# Patient Record
Sex: MALE | Race: WHITE | NOT HISPANIC OR LATINO | Employment: OTHER | URBAN - NONMETROPOLITAN AREA
[De-identification: names, ages, dates, MRNs, and addresses within clinical notes are randomized per-mention and may not be internally consistent; named-entity substitution may affect disease eponyms.]

---

## 2023-02-27 PROBLEM — H35.30 MACULAR DEGENERATION: Status: ACTIVE | Noted: 2023-02-27

## 2023-02-27 PROBLEM — J32.9 CHRONIC SINUSITIS: Status: ACTIVE | Noted: 2023-02-27

## 2023-02-27 PROBLEM — E55.9 VITAMIN D DEFICIENCY: Status: ACTIVE | Noted: 2023-02-27

## 2023-02-27 PROBLEM — R91.8 MULTIPLE LUNG NODULES: Status: ACTIVE | Noted: 2023-02-27

## 2023-02-27 PROBLEM — M25.561 RIGHT KNEE PAIN: Status: ACTIVE | Noted: 2023-02-27

## 2023-02-27 PROBLEM — J30.9 ALLERGIC RHINITIS: Status: ACTIVE | Noted: 2023-02-27

## 2023-02-27 PROBLEM — I42.9 CARDIOMYOPATHY (MULTI): Status: ACTIVE | Noted: 2023-02-27

## 2023-02-27 PROBLEM — I48.0 PAROXYSMAL ATRIAL FIBRILLATION (MULTI): Status: ACTIVE | Noted: 2023-02-27

## 2023-02-27 PROBLEM — E78.5 HYPERLIPIDEMIA: Status: ACTIVE | Noted: 2023-02-27

## 2023-02-27 PROBLEM — E53.8 VITAMIN B12 DEFICIENCY: Status: ACTIVE | Noted: 2023-02-27

## 2023-02-27 PROBLEM — J45.909 REACTIVE AIRWAY DISEASE (HHS-HCC): Status: ACTIVE | Noted: 2023-02-27

## 2023-02-27 PROBLEM — K63.5 COLON POLYPS: Status: ACTIVE | Noted: 2023-02-27

## 2023-02-27 PROBLEM — N40.0 ENLARGED PROSTATE WITHOUT LOWER URINARY TRACT SYMPTOMS (LUTS): Status: ACTIVE | Noted: 2023-02-27

## 2023-02-27 PROBLEM — I87.2 EDEMA OF BOTH LOWER LEGS DUE TO PERIPHERAL VENOUS INSUFFICIENCY: Status: ACTIVE | Noted: 2023-02-27

## 2023-02-27 PROBLEM — I25.10 CAD (CORONARY ARTERY DISEASE): Status: ACTIVE | Noted: 2023-02-27

## 2023-02-27 PROBLEM — I49.3 FREQUENT PVCS: Status: ACTIVE | Noted: 2023-02-27

## 2023-02-27 PROBLEM — F41.9 ANXIETY AND DEPRESSION: Status: ACTIVE | Noted: 2023-02-27

## 2023-02-27 PROBLEM — N28.9 KIDNEY LESION, NATIVE, BILATERAL: Status: ACTIVE | Noted: 2023-02-27

## 2023-02-27 PROBLEM — E11.40 DIABETIC NEUROPATHY (MULTI): Status: ACTIVE | Noted: 2023-02-27

## 2023-02-27 PROBLEM — J31.0 CHRONIC RHINITIS: Status: ACTIVE | Noted: 2023-02-27

## 2023-02-27 PROBLEM — I10 BENIGN ESSENTIAL HYPERTENSION: Status: ACTIVE | Noted: 2023-02-27

## 2023-02-27 PROBLEM — R60.0 BILATERAL EDEMA OF LOWER EXTREMITY: Status: ACTIVE | Noted: 2023-02-27

## 2023-02-27 PROBLEM — R60.0 EDEMA OF BOTH LOWER LEGS DUE TO PERIPHERAL VENOUS INSUFFICIENCY: Status: ACTIVE | Noted: 2023-02-27

## 2023-02-27 PROBLEM — I34.0 MITRAL REGURGITATION: Status: ACTIVE | Noted: 2023-02-27

## 2023-02-27 PROBLEM — M54.30 SCIATICA: Status: ACTIVE | Noted: 2023-02-27

## 2023-02-27 PROBLEM — N40.1 BPH WITH OBSTRUCTION/LOWER URINARY TRACT SYMPTOMS: Status: ACTIVE | Noted: 2023-02-27

## 2023-02-27 PROBLEM — K21.9 GERD (GASTROESOPHAGEAL REFLUX DISEASE): Status: ACTIVE | Noted: 2023-02-27

## 2023-02-27 PROBLEM — R01.1 MURMUR: Status: ACTIVE | Noted: 2023-02-27

## 2023-02-27 PROBLEM — D48.5 BASAL CELL TUMOR: Status: ACTIVE | Noted: 2023-02-27

## 2023-02-27 PROBLEM — M17.11 LOCALIZED OSTEOARTHRITIS OF RIGHT KNEE: Status: ACTIVE | Noted: 2023-02-27

## 2023-02-27 PROBLEM — N13.8 BPH WITH OBSTRUCTION/LOWER URINARY TRACT SYMPTOMS: Status: ACTIVE | Noted: 2023-02-27

## 2023-02-27 PROBLEM — R06.09 DYSPNEA ON EXERTION: Status: ACTIVE | Noted: 2023-02-27

## 2023-02-27 PROBLEM — E11.9 DIABETES MELLITUS (MULTI): Status: ACTIVE | Noted: 2023-02-27

## 2023-02-27 PROBLEM — J01.90 ACUTE SINUSITIS: Status: ACTIVE | Noted: 2023-02-27

## 2023-02-27 PROBLEM — F32.A ANXIETY AND DEPRESSION: Status: ACTIVE | Noted: 2023-02-27

## 2023-02-27 PROBLEM — L71.9 ACNE ROSACEA: Status: ACTIVE | Noted: 2023-02-27

## 2023-02-27 RX ORDER — FINASTERIDE 5 MG/1
1 TABLET, FILM COATED ORAL
COMMUNITY
Start: 2014-03-12 | End: 2023-04-18 | Stop reason: SDUPTHER

## 2023-02-27 RX ORDER — LOSARTAN POTASSIUM AND HYDROCHLOROTHIAZIDE 12.5; 1 MG/1; MG/1
1 TABLET ORAL
COMMUNITY
Start: 2021-01-08 | End: 2023-04-18 | Stop reason: SDUPTHER

## 2023-02-27 RX ORDER — MONTELUKAST SODIUM 10 MG/1
1 TABLET ORAL EVERY EVENING
COMMUNITY
Start: 2018-10-03 | End: 2023-04-18 | Stop reason: SDUPTHER

## 2023-02-27 RX ORDER — METOPROLOL SUCCINATE 100 MG/1
0.5 TABLET, EXTENDED RELEASE ORAL DAILY
COMMUNITY
Start: 2021-11-09 | End: 2023-04-18 | Stop reason: SDUPTHER

## 2023-02-27 RX ORDER — ALBUTEROL SULFATE 90 UG/1
AEROSOL, METERED RESPIRATORY (INHALATION)
COMMUNITY
Start: 2020-05-15 | End: 2023-04-18 | Stop reason: SDUPTHER

## 2023-02-27 RX ORDER — LANCETS
EACH MISCELLANEOUS
COMMUNITY
End: 2024-04-15

## 2023-02-27 RX ORDER — ACETAMINOPHEN 500 MG
1 TABLET ORAL
COMMUNITY
End: 2023-04-18 | Stop reason: SDUPTHER

## 2023-02-27 RX ORDER — METFORMIN HYDROCHLORIDE 500 MG/1
1000 TABLET, EXTENDED RELEASE ORAL
COMMUNITY
Start: 2014-03-12 | End: 2023-04-18 | Stop reason: SDUPTHER

## 2023-02-27 RX ORDER — BLOOD SUGAR DIAGNOSTIC
STRIP MISCELLANEOUS
COMMUNITY
Start: 2017-05-05 | End: 2024-04-18 | Stop reason: SDUPTHER

## 2023-02-27 RX ORDER — TORSEMIDE 20 MG/1
20 TABLET ORAL
COMMUNITY
Start: 2022-08-26 | End: 2023-04-11 | Stop reason: SINTOL

## 2023-02-27 RX ORDER — ACETAMINOPHEN, DEXTROMETHORPHAN HBR, DOXYLAMINE SUCCINATE, PHENYLEPHRINE HCL 650; 20; 12.5; 1 MG/30ML; MG/30ML; MG/30ML; MG/30ML
1 SOLUTION ORAL
COMMUNITY

## 2023-02-27 RX ORDER — VENLAFAXINE 75 MG/1
1 TABLET ORAL
COMMUNITY
Start: 2019-06-13 | End: 2023-04-18 | Stop reason: SDUPTHER

## 2023-02-27 RX ORDER — LOVASTATIN 40 MG/1
40 TABLET ORAL
COMMUNITY
Start: 2014-03-12 | End: 2023-04-18 | Stop reason: SDUPTHER

## 2023-02-27 RX ORDER — OMEPRAZOLE 40 MG/1
40 CAPSULE, DELAYED RELEASE ORAL
COMMUNITY
Start: 2014-03-12 | End: 2023-04-18 | Stop reason: SDUPTHER

## 2023-02-27 RX ORDER — DOXYCYCLINE HYCLATE 50 MG/1
50 CAPSULE ORAL
COMMUNITY
Start: 2018-05-03 | End: 2023-03-13

## 2023-02-27 RX ORDER — TAMSULOSIN HYDROCHLORIDE 0.4 MG/1
2 CAPSULE ORAL NIGHTLY
COMMUNITY
Start: 2014-03-12 | End: 2023-04-18 | Stop reason: SDUPTHER

## 2023-02-27 RX ORDER — MELOXICAM 15 MG/1
15 TABLET ORAL DAILY PRN
COMMUNITY
Start: 2018-11-30 | End: 2023-04-18 | Stop reason: SDUPTHER

## 2023-03-11 DIAGNOSIS — L71.9 ROSACEA, UNSPECIFIED: ICD-10-CM

## 2023-03-13 RX ORDER — DOXYCYCLINE HYCLATE 50 MG/1
CAPSULE ORAL
Qty: 90 CAPSULE | Refills: 3 | Status: SHIPPED | OUTPATIENT
Start: 2023-03-13 | End: 2023-04-11

## 2023-04-11 ENCOUNTER — OFFICE VISIT (OUTPATIENT)
Dept: PRIMARY CARE | Facility: CLINIC | Age: 88
End: 2023-04-11
Payer: MEDICARE

## 2023-04-11 ENCOUNTER — LAB (OUTPATIENT)
Dept: LAB | Facility: LAB | Age: 88
End: 2023-04-11
Payer: MEDICARE

## 2023-04-11 VITALS
HEART RATE: 67 BPM | RESPIRATION RATE: 14 BRPM | WEIGHT: 179.6 LBS | TEMPERATURE: 97.7 F | BODY MASS INDEX: 29.92 KG/M2 | HEIGHT: 65 IN | SYSTOLIC BLOOD PRESSURE: 127 MMHG | OXYGEN SATURATION: 94 % | DIASTOLIC BLOOD PRESSURE: 66 MMHG

## 2023-04-11 DIAGNOSIS — E11.40 TYPE 2 DIABETES MELLITUS WITH DIABETIC NEUROPATHY, WITHOUT LONG-TERM CURRENT USE OF INSULIN (MULTI): ICD-10-CM

## 2023-04-11 DIAGNOSIS — E78.2 MIXED HYPERLIPIDEMIA: ICD-10-CM

## 2023-04-11 DIAGNOSIS — L71.9 ACNE ROSACEA: ICD-10-CM

## 2023-04-11 DIAGNOSIS — I10 BENIGN ESSENTIAL HYPERTENSION: ICD-10-CM

## 2023-04-11 DIAGNOSIS — Z12.5 SCREENING PSA (PROSTATE SPECIFIC ANTIGEN): ICD-10-CM

## 2023-04-11 DIAGNOSIS — H35.3132 INTERMEDIATE STAGE NONEXUDATIVE AGE-RELATED MACULAR DEGENERATION OF BOTH EYES: Primary | ICD-10-CM

## 2023-04-11 DIAGNOSIS — E11.49 OTHER DIABETIC NEUROLOGICAL COMPLICATION ASSOCIATED WITH TYPE 2 DIABETES MELLITUS (MULTI): ICD-10-CM

## 2023-04-11 DIAGNOSIS — Z00.00 MEDICARE ANNUAL WELLNESS VISIT, SUBSEQUENT: ICD-10-CM

## 2023-04-11 PROBLEM — H69.90 DYSFUNCTION OF EUSTACHIAN TUBE: Status: ACTIVE | Noted: 2019-07-11

## 2023-04-11 PROBLEM — K57.90 DIVERTICULOSIS: Status: ACTIVE | Noted: 2019-07-11

## 2023-04-11 PROBLEM — H91.90 HOH (HARD OF HEARING): Status: ACTIVE | Noted: 2023-04-11

## 2023-04-11 PROBLEM — J33.8 MULTIPLE POLYPS OF ETHMOID SINUS: Status: ACTIVE | Noted: 2023-04-11

## 2023-04-11 PROBLEM — Z86.010 HX OF ADENOMATOUS COLONIC POLYPS: Status: ACTIVE | Noted: 2023-04-11

## 2023-04-11 PROBLEM — K21.00 GASTRO-ESOPHAGEAL REFLUX DISEASE WITH ESOPHAGITIS: Status: ACTIVE | Noted: 2019-07-11

## 2023-04-11 PROBLEM — E66.9 OBESITY, CLASS I, BMI 30-34.9: Status: ACTIVE | Noted: 2018-06-28

## 2023-04-11 PROBLEM — E11.9 TYPE 2 DIABETES MELLITUS WITHOUT COMPLICATION (MULTI): Status: ACTIVE | Noted: 2019-07-11

## 2023-04-11 PROBLEM — E66.811 OBESITY, CLASS I, BMI 30-34.9: Status: ACTIVE | Noted: 2018-06-28

## 2023-04-11 PROBLEM — Z86.0101 HX OF ADENOMATOUS COLONIC POLYPS: Status: ACTIVE | Noted: 2023-04-11

## 2023-04-11 LAB
ALANINE AMINOTRANSFERASE (SGPT) (U/L) IN SER/PLAS: 21 U/L (ref 10–52)
ALBUMIN (G/DL) IN SER/PLAS: 4.4 G/DL (ref 3.4–5)
ALKALINE PHOSPHATASE (U/L) IN SER/PLAS: 55 U/L (ref 33–136)
ANION GAP IN SER/PLAS: 16 MMOL/L (ref 10–20)
ASPARTATE AMINOTRANSFERASE (SGOT) (U/L) IN SER/PLAS: 26 U/L (ref 9–39)
BASOPHILS (10*3/UL) IN BLOOD BY AUTOMATED COUNT: 0.04 X10E9/L (ref 0–0.1)
BASOPHILS/100 LEUKOCYTES IN BLOOD BY AUTOMATED COUNT: 0.6 % (ref 0–2)
BILIRUBIN TOTAL (MG/DL) IN SER/PLAS: 0.7 MG/DL (ref 0–1.2)
CALCIUM (MG/DL) IN SER/PLAS: 9.7 MG/DL (ref 8.6–10.6)
CARBON DIOXIDE, TOTAL (MMOL/L) IN SER/PLAS: 28 MMOL/L (ref 21–32)
CHLORIDE (MMOL/L) IN SER/PLAS: 99 MMOL/L (ref 98–107)
CHOLESTEROL (MG/DL) IN SER/PLAS: 138 MG/DL (ref 0–199)
CHOLESTEROL IN HDL (MG/DL) IN SER/PLAS: 47.3 MG/DL
CHOLESTEROL/HDL RATIO: 2.9
CREATININE (MG/DL) IN SER/PLAS: 1.68 MG/DL (ref 0.5–1.3)
EOSINOPHILS (10*3/UL) IN BLOOD BY AUTOMATED COUNT: 0.18 X10E9/L (ref 0–0.4)
EOSINOPHILS/100 LEUKOCYTES IN BLOOD BY AUTOMATED COUNT: 2.5 % (ref 0–6)
ERYTHROCYTE DISTRIBUTION WIDTH (RATIO) BY AUTOMATED COUNT: 14.3 % (ref 11.5–14.5)
ERYTHROCYTE MEAN CORPUSCULAR HEMOGLOBIN CONCENTRATION (G/DL) BY AUTOMATED: 32.5 G/DL (ref 32–36)
ERYTHROCYTE MEAN CORPUSCULAR VOLUME (FL) BY AUTOMATED COUNT: 93 FL (ref 80–100)
ERYTHROCYTES (10*6/UL) IN BLOOD BY AUTOMATED COUNT: 3.94 X10E12/L (ref 4.5–5.9)
ESTIMATED AVERAGE GLUCOSE FOR HBA1C: 134 MG/DL
GFR MALE: 39 ML/MIN/1.73M2
GLUCOSE (MG/DL) IN SER/PLAS: 108 MG/DL (ref 74–99)
HEMATOCRIT (%) IN BLOOD BY AUTOMATED COUNT: 36.6 % (ref 41–52)
HEMOGLOBIN (G/DL) IN BLOOD: 11.9 G/DL (ref 13.5–17.5)
HEMOGLOBIN A1C/HEMOGLOBIN TOTAL IN BLOOD: 6.3 %
IMMATURE GRANULOCYTES/100 LEUKOCYTES IN BLOOD BY AUTOMATED COUNT: 0.3 % (ref 0–0.9)
LDL: 58 MG/DL (ref 0–99)
LEUKOCYTES (10*3/UL) IN BLOOD BY AUTOMATED COUNT: 7.1 X10E9/L (ref 4.4–11.3)
LYMPHOCYTES (10*3/UL) IN BLOOD BY AUTOMATED COUNT: 1.37 X10E9/L (ref 0.8–3)
LYMPHOCYTES/100 LEUKOCYTES IN BLOOD BY AUTOMATED COUNT: 19.3 % (ref 13–44)
MONOCYTES (10*3/UL) IN BLOOD BY AUTOMATED COUNT: 0.93 X10E9/L (ref 0.05–0.8)
MONOCYTES/100 LEUKOCYTES IN BLOOD BY AUTOMATED COUNT: 13.1 % (ref 2–10)
NEUTROPHILS (10*3/UL) IN BLOOD BY AUTOMATED COUNT: 4.56 X10E9/L (ref 1.6–5.5)
NEUTROPHILS/100 LEUKOCYTES IN BLOOD BY AUTOMATED COUNT: 64.2 % (ref 40–80)
NRBC (PER 100 WBCS) BY AUTOMATED COUNT: 0 /100 WBC (ref 0–0)
PLATELETS (10*3/UL) IN BLOOD AUTOMATED COUNT: 207 X10E9/L (ref 150–450)
POTASSIUM (MMOL/L) IN SER/PLAS: 4.4 MMOL/L (ref 3.5–5.3)
PROSTATE SPECIFIC ANTIGEN,SCREEN: 2.33 NG/ML (ref 0–4)
PROTEIN TOTAL: 7.1 G/DL (ref 6.4–8.2)
SODIUM (MMOL/L) IN SER/PLAS: 139 MMOL/L (ref 136–145)
TRIGLYCERIDE (MG/DL) IN SER/PLAS: 162 MG/DL (ref 0–149)
UREA NITROGEN (MG/DL) IN SER/PLAS: 36 MG/DL (ref 6–23)
VLDL: 32 MG/DL (ref 0–40)

## 2023-04-11 PROCEDURE — G0439 PPPS, SUBSEQ VISIT: HCPCS | Performed by: FAMILY MEDICINE

## 2023-04-11 PROCEDURE — G0444 DEPRESSION SCREEN ANNUAL: HCPCS | Performed by: FAMILY MEDICINE

## 2023-04-11 PROCEDURE — 1036F TOBACCO NON-USER: CPT | Performed by: FAMILY MEDICINE

## 2023-04-11 PROCEDURE — 36415 COLL VENOUS BLD VENIPUNCTURE: CPT

## 2023-04-11 PROCEDURE — 80061 LIPID PANEL: CPT

## 2023-04-11 PROCEDURE — 1160F RVW MEDS BY RX/DR IN RCRD: CPT | Performed by: FAMILY MEDICINE

## 2023-04-11 PROCEDURE — 3074F SYST BP LT 130 MM HG: CPT | Performed by: FAMILY MEDICINE

## 2023-04-11 PROCEDURE — 99214 OFFICE O/P EST MOD 30 MIN: CPT | Performed by: FAMILY MEDICINE

## 2023-04-11 PROCEDURE — 85025 COMPLETE CBC W/AUTO DIFF WBC: CPT

## 2023-04-11 PROCEDURE — 1159F MED LIST DOCD IN RCRD: CPT | Performed by: FAMILY MEDICINE

## 2023-04-11 PROCEDURE — 84153 ASSAY OF PSA TOTAL: CPT

## 2023-04-11 PROCEDURE — 3078F DIAST BP <80 MM HG: CPT | Performed by: FAMILY MEDICINE

## 2023-04-11 PROCEDURE — 1170F FXNL STATUS ASSESSED: CPT | Performed by: FAMILY MEDICINE

## 2023-04-11 PROCEDURE — 83036 HEMOGLOBIN GLYCOSYLATED A1C: CPT

## 2023-04-11 PROCEDURE — 80053 COMPREHEN METABOLIC PANEL: CPT

## 2023-04-11 RX ORDER — FUROSEMIDE 20 MG/1
20 TABLET ORAL DAILY
COMMUNITY
Start: 2023-03-09 | End: 2023-04-18 | Stop reason: SDUPTHER

## 2023-04-11 RX ORDER — DOXYCYCLINE 50 MG/1
CAPSULE ORAL
Qty: 90 CAPSULE | Refills: 1 | Status: SHIPPED | OUTPATIENT
Start: 2023-04-11 | End: 2023-08-30 | Stop reason: ALTCHOICE

## 2023-04-11 ASSESSMENT — ACTIVITIES OF DAILY LIVING (ADL)
DRESSING: INDEPENDENT
GROCERY_SHOPPING: INDEPENDENT
TAKING_MEDICATION: INDEPENDENT
MANAGING_FINANCES: INDEPENDENT
BATHING: INDEPENDENT
DOING_HOUSEWORK: INDEPENDENT

## 2023-04-11 ASSESSMENT — PATIENT HEALTH QUESTIONNAIRE - PHQ9
2. FEELING DOWN, DEPRESSED OR HOPELESS: NOT AT ALL
1. LITTLE INTEREST OR PLEASURE IN DOING THINGS: NOT AT ALL
SUM OF ALL RESPONSES TO PHQ9 QUESTIONS 1 AND 2: 0

## 2023-04-11 ASSESSMENT — ENCOUNTER SYMPTOMS
DEPRESSION: 0
OCCASIONAL FEELINGS OF UNSTEADINESS: 1
LOSS OF SENSATION IN FEET: 0

## 2023-04-11 ASSESSMENT — PAIN SCALES - GENERAL: PAINLEVEL: 6

## 2023-04-11 NOTE — PROGRESS NOTES
"Subjective   Reason for Visit: Agustin Panda is an 88 y.o. male here for a Medicare Wellness visit.          Reviewed all medications by prescribing practitioner or clinical pharmacist (such as prescriptions, OTCs, herbal therapies and supplements) and documented in the medical record.    HPI    Patient Care Team:  Martin Sanchez MD as PCP - General  Martin Sanchez MD as PCP - [employer] PCP     Review of Systems    Objective   Vitals:  /66 (BP Location: Right arm, Patient Position: Sitting, BP Cuff Size: Adult)   Pulse 67   Temp 36.5 °C (97.7 °F) (Temporal)   Resp 14   Ht 1.651 m (5' 5\")   Wt 81.5 kg (179 lb 9.6 oz)   SpO2 94%   BMI 29.89 kg/m²       Physical Exam    Assessment/Plan   Problem List Items Addressed This Visit    None         "

## 2023-04-11 NOTE — PROGRESS NOTES
"Subjective   Patient ID: Agustin Panda is a 88 y.o. male who presents for Medicare Annual Wellness Visit Subsequent, Edema (Left foot still swollen since last time he here), and Diabetes (Wants a new meter that he does not need to prick his finger anymore, Dexcom sensor ).    HPI   Farhad was seen today for a routine follow-up of his hyperlipidemia, diabetes, rosacea, left lower extremity edema, other medications.  He is now back on Lasix, off of torsemide due to muscle cramps, which have improved.  Labs from August reviewed, he is fasting today.  Urine for albumin is up-to-date.  We discussed urology guidelines, lack of need for PSA, he would still like this checked.    He is still frustrated with his left lower extremity edema, states that it is 80% better in the morning, worsens by evening.  He has tried a couple of compression stockings, has difficulty getting them on, taking them off.  Medication(s) are being taken and tolerated as prescribed, without concerns, list reconciled today.  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  He has seen optometry, but not ophthalmology recently.  He is to see Dr. Vernon but has not seen a retinal specialist.    Review of Systems  The full, 10+ multi-organ review of systems, is within normal limits with the exception of what is noted above in HPI.  Objective   /66 (BP Location: Right arm, Patient Position: Sitting, BP Cuff Size: Adult)   Pulse 67   Temp 36.5 °C (97.7 °F) (Temporal)   Resp 14   Ht 1.651 m (5' 5\")   Wt 81.5 kg (179 lb 9.6 oz)   SpO2 94%   BMI 29.89 kg/m²     Physical Exam  Constitutional/General appearance: alert, oriented, well-appearing, in no distress  Head and face exam is normal  No scleral icterus or conjunctival erythema present  Hearing is grossly normal  Respiratory effort is normal, no " dyspnea noted  Cortical function is normal  Mood, affect, are pleasant, appropriate, and interactive.  Insight is normal  Cardiac exam reveals a regular rate and rhythm,  murmur present.   Lungs are clear bilaterally.    Trace right lower extremity edema present, 1+ left lower extremity edema, ends just above the ankle.  Foot exam otherwise normal with exception of onychomycosis, multiple toes.    Assessment/Plan     Type II diabetes mellitus--- since the HBA1C's are/have remained stable, the current plan will be continued, including prescription medication (refilled as noted), a low carbohydrate diet, as well as regular exercise as tolerated.  I have recommended the next followup be in 6 months.  The importance of an annual dilated diabetic eye exam, annual urine for microalbumin, an annual foot exam, as well as signs and symptoms of hypoglycemia and peripheral neuropathy were stressed.  Routine self examination of the feet will be continued.  Labs will be assessed on at least a twice yearly basis.    Cardiomyopathy, continue current medications, follow-up with cardiology    Hyperlipidemia--- since lipid panels are/have been stable, I have recommended continuing the current regimen.  This includes a low fat/high-fiber diet, to include foods rich in natural Omega-3's, such as seafood, nuts, and olives, so long as allergies do not prohibit.  Exercise should be continued as able.  Refills were sent as needed.  We will continue followup on an every six-month basis, and will address further needs/issues should they arise.    Depression-----currently, symptoms are stable, both physical and emotional.  We will continue the current regimen of medication, as noted above.  Refills were provided, as needed.  Behavioral measures are to be continued, including a healthy diet, regular exercise, good sleep hygiene, minimal caffeine, and avoidance of alcohol.  Follow up should be in 6 months, or sooner if needed    Rosacea, change  doxycycline to every Monday Wednesday Friday    Hypertension--- since today's blood pressures are at goal, I have recommended continuing the current treatment regimen, including medication as noted above, as well as a low salt, low-fat, high-fiber diet.  Exercise is to be continued as able and tolerated.  We will continue to follow the high blood pressure on an every six-month basis, and address additional needs should they arise.    All Medicare gaps closed.

## 2023-04-14 DIAGNOSIS — I10 BENIGN ESSENTIAL HYPERTENSION: Primary | ICD-10-CM

## 2023-04-14 NOTE — RESULT ENCOUNTER NOTE
All labs are stable and reassuring, with the exception of his kidney function, which is decreased, perhaps due to his diuretic, Lasix.  Recommend stopping it for 5 to 7 days, coming back for a lab recheck, order placed

## 2023-04-18 DIAGNOSIS — F41.9 ANXIETY AND DEPRESSION: ICD-10-CM

## 2023-04-18 DIAGNOSIS — E08.00 DIABETES MELLITUS DUE TO UNDERLYING CONDITION WITH HYPEROSMOLARITY WITHOUT COMA, WITHOUT LONG-TERM CURRENT USE OF INSULIN (MULTI): ICD-10-CM

## 2023-04-18 DIAGNOSIS — F32.A ANXIETY AND DEPRESSION: ICD-10-CM

## 2023-04-18 DIAGNOSIS — E55.9 VITAMIN D DEFICIENCY: ICD-10-CM

## 2023-04-18 DIAGNOSIS — M25.561 RIGHT KNEE PAIN, UNSPECIFIED CHRONICITY: ICD-10-CM

## 2023-04-18 DIAGNOSIS — K21.00 GASTROESOPHAGEAL REFLUX DISEASE WITH ESOPHAGITIS WITHOUT HEMORRHAGE: ICD-10-CM

## 2023-04-18 DIAGNOSIS — I42.8 OTHER CARDIOMYOPATHY (MULTI): ICD-10-CM

## 2023-04-18 DIAGNOSIS — N40.1 BPH WITH OBSTRUCTION/LOWER URINARY TRACT SYMPTOMS: ICD-10-CM

## 2023-04-18 DIAGNOSIS — N13.8 BPH WITH OBSTRUCTION/LOWER URINARY TRACT SYMPTOMS: ICD-10-CM

## 2023-04-18 DIAGNOSIS — J45.30 MILD PERSISTENT REACTIVE AIRWAY DISEASE WITHOUT COMPLICATION (HHS-HCC): ICD-10-CM

## 2023-04-18 DIAGNOSIS — E78.49 OTHER HYPERLIPIDEMIA: ICD-10-CM

## 2023-04-18 DIAGNOSIS — I10 BENIGN ESSENTIAL HYPERTENSION: ICD-10-CM

## 2023-04-18 RX ORDER — LOVASTATIN 40 MG/1
40 TABLET ORAL
Qty: 90 TABLET | Refills: 1 | Status: SHIPPED | OUTPATIENT
Start: 2023-04-18 | End: 2023-11-22 | Stop reason: SDUPTHER

## 2023-04-18 RX ORDER — MELOXICAM 15 MG/1
15 TABLET ORAL DAILY PRN
Qty: 90 TABLET | Refills: 2 | Status: SHIPPED | OUTPATIENT
Start: 2023-04-18 | End: 2023-04-24 | Stop reason: SINTOL

## 2023-04-18 RX ORDER — FINASTERIDE 5 MG/1
5 TABLET, FILM COATED ORAL
Qty: 90 TABLET | Refills: 1 | Status: SHIPPED | OUTPATIENT
Start: 2023-04-18 | End: 2023-11-15 | Stop reason: SDUPTHER

## 2023-04-18 RX ORDER — ACETAMINOPHEN 500 MG
5000 TABLET ORAL
Qty: 90 TABLET | Refills: 1 | Status: SHIPPED | OUTPATIENT
Start: 2023-04-18 | End: 2023-10-13

## 2023-04-18 RX ORDER — TAMSULOSIN HYDROCHLORIDE 0.4 MG/1
0.8 CAPSULE ORAL NIGHTLY
Qty: 90 CAPSULE | Refills: 1 | Status: SHIPPED | OUTPATIENT
Start: 2023-04-18 | End: 2023-07-16

## 2023-04-18 RX ORDER — LOSARTAN POTASSIUM AND HYDROCHLOROTHIAZIDE 12.5; 1 MG/1; MG/1
1 TABLET ORAL
Qty: 90 TABLET | Refills: 1 | Status: SHIPPED | OUTPATIENT
Start: 2023-04-18 | End: 2023-04-24 | Stop reason: SINTOL

## 2023-04-18 RX ORDER — OMEPRAZOLE 40 MG/1
40 CAPSULE, DELAYED RELEASE ORAL
Qty: 90 CAPSULE | Refills: 1 | Status: SHIPPED | OUTPATIENT
Start: 2023-04-18 | End: 2023-05-16 | Stop reason: ALTCHOICE

## 2023-04-18 RX ORDER — METFORMIN HYDROCHLORIDE 500 MG/1
1000 TABLET, EXTENDED RELEASE ORAL
Qty: 180 TABLET | Refills: 1 | Status: SHIPPED | OUTPATIENT
Start: 2023-04-18 | End: 2023-05-12 | Stop reason: ALTCHOICE

## 2023-04-18 RX ORDER — ALBUTEROL SULFATE 90 UG/1
2 AEROSOL, METERED RESPIRATORY (INHALATION) EVERY 6 HOURS PRN
Qty: 18 G | Refills: 2 | Status: SHIPPED | OUTPATIENT
Start: 2023-04-18 | End: 2024-01-24 | Stop reason: ALTCHOICE

## 2023-04-18 RX ORDER — VENLAFAXINE 75 MG/1
75 TABLET ORAL
Qty: 180 TABLET | Refills: 1 | Status: SHIPPED | OUTPATIENT
Start: 2023-04-18 | End: 2023-05-12 | Stop reason: ALTCHOICE

## 2023-04-18 RX ORDER — MONTELUKAST SODIUM 10 MG/1
10 TABLET ORAL EVERY EVENING
Qty: 90 TABLET | Refills: 1 | Status: SHIPPED | OUTPATIENT
Start: 2023-04-18 | End: 2023-10-18

## 2023-04-18 RX ORDER — FUROSEMIDE 20 MG/1
20 TABLET ORAL DAILY
Qty: 90 TABLET | Refills: 1 | Status: SHIPPED | OUTPATIENT
Start: 2023-04-18 | End: 2023-05-12 | Stop reason: SINTOL

## 2023-04-18 RX ORDER — METOPROLOL SUCCINATE 100 MG/1
50 TABLET, EXTENDED RELEASE ORAL DAILY
Qty: 90 TABLET | Refills: 1 | Status: SHIPPED | OUTPATIENT
Start: 2023-04-18 | End: 2024-04-04 | Stop reason: ALTCHOICE

## 2023-04-21 ENCOUNTER — LAB (OUTPATIENT)
Dept: LAB | Facility: LAB | Age: 88
End: 2023-04-21
Payer: MEDICARE

## 2023-04-21 DIAGNOSIS — I10 BENIGN ESSENTIAL HYPERTENSION: ICD-10-CM

## 2023-04-21 LAB
ANION GAP IN SER/PLAS: 15 MMOL/L (ref 10–20)
CALCIUM (MG/DL) IN SER/PLAS: 9.2 MG/DL (ref 8.6–10.6)
CARBON DIOXIDE, TOTAL (MMOL/L) IN SER/PLAS: 26 MMOL/L (ref 21–32)
CHLORIDE (MMOL/L) IN SER/PLAS: 103 MMOL/L (ref 98–107)
CREATININE (MG/DL) IN SER/PLAS: 1.49 MG/DL (ref 0.5–1.3)
GFR MALE: 45 ML/MIN/1.73M2
GLUCOSE (MG/DL) IN SER/PLAS: 102 MG/DL (ref 74–99)
POTASSIUM (MMOL/L) IN SER/PLAS: 4.2 MMOL/L (ref 3.5–5.3)
SODIUM (MMOL/L) IN SER/PLAS: 140 MMOL/L (ref 136–145)
UREA NITROGEN (MG/DL) IN SER/PLAS: 27 MG/DL (ref 6–23)

## 2023-04-21 PROCEDURE — 80048 BASIC METABOLIC PNL TOTAL CA: CPT

## 2023-04-21 PROCEDURE — 36415 COLL VENOUS BLD VENIPUNCTURE: CPT

## 2023-04-24 DIAGNOSIS — I10 BENIGN ESSENTIAL HYPERTENSION: Primary | ICD-10-CM

## 2023-04-24 RX ORDER — LOSARTAN POTASSIUM 100 MG/1
100 TABLET ORAL DAILY
Qty: 90 TABLET | Refills: 3 | Status: SHIPPED | OUTPATIENT
Start: 2023-04-24 | End: 2023-11-15 | Stop reason: SDUPTHER

## 2023-04-24 NOTE — RESULT ENCOUNTER NOTE
Kidney function is still mildly reduced from his baseline.  Need to do 2 things.  Stop meloxicam (is his anti-inflammatory pain med), as it can irritate the kidneys.  Stop his current losartan/hydrochlorothiazide, need to get rid of the diuretic part, and replace with plain losartan 100mg daily, Rx sent to [facility].  Recheck kidney function in 3 weeks or so

## 2023-05-08 ENCOUNTER — TELEPHONE (OUTPATIENT)
Dept: PRIMARY CARE | Facility: CLINIC | Age: 88
End: 2023-05-08
Payer: MEDICARE

## 2023-05-08 NOTE — TELEPHONE ENCOUNTER
Since his kidney function was a little off in April, I wouldn't make any changes until it is rechecked.  Since he is seeing me Friday, can he get the blood work checked Thursday?

## 2023-05-08 NOTE — TELEPHONE ENCOUNTER
Pt calling said he is going in for his blood work on Friday to have his kidney levels checked like you wanted.     Pt said that both of his feet are now swollen and that he mentions at last visit. Wanted to know what you advise? Aware you are out till tomorrow.    Looks like you had an opening on Friday Morning so I scheduled pt.

## 2023-05-08 NOTE — TELEPHONE ENCOUNTER
Patient is concerned that he will not have a ride to the lab/our office two days in a row.  I advised patient anytime he can get a ride to the lab prior to Friday would be fine and he does not have to fast.  He will call us if he has any issues.  He is aware this needs rechecked before swollen legs can be assessed.

## 2023-05-11 ENCOUNTER — LAB (OUTPATIENT)
Dept: LAB | Facility: LAB | Age: 88
End: 2023-05-11
Payer: MEDICARE

## 2023-05-11 DIAGNOSIS — I10 BENIGN ESSENTIAL HYPERTENSION: ICD-10-CM

## 2023-05-11 PROCEDURE — 36415 COLL VENOUS BLD VENIPUNCTURE: CPT

## 2023-05-11 PROCEDURE — 80048 BASIC METABOLIC PNL TOTAL CA: CPT

## 2023-05-12 ENCOUNTER — OFFICE VISIT (OUTPATIENT)
Dept: PRIMARY CARE | Facility: CLINIC | Age: 88
End: 2023-05-12
Payer: MEDICARE

## 2023-05-12 VITALS
WEIGHT: 187.5 LBS | DIASTOLIC BLOOD PRESSURE: 70 MMHG | SYSTOLIC BLOOD PRESSURE: 166 MMHG | HEART RATE: 61 BPM | RESPIRATION RATE: 14 BRPM | TEMPERATURE: 97.8 F | OXYGEN SATURATION: 94 % | BODY MASS INDEX: 31.2 KG/M2

## 2023-05-12 DIAGNOSIS — R60.0 BILATERAL EDEMA OF LOWER EXTREMITY: ICD-10-CM

## 2023-05-12 DIAGNOSIS — I42.9 CARDIOMYOPATHY, UNSPECIFIED TYPE (MULTI): ICD-10-CM

## 2023-05-12 DIAGNOSIS — E11.49 OTHER DIABETIC NEUROLOGICAL COMPLICATION ASSOCIATED WITH TYPE 2 DIABETES MELLITUS (MULTI): ICD-10-CM

## 2023-05-12 DIAGNOSIS — I10 BENIGN ESSENTIAL HYPERTENSION: ICD-10-CM

## 2023-05-12 DIAGNOSIS — N28.9 DECREASED RENAL FUNCTION: ICD-10-CM

## 2023-05-12 DIAGNOSIS — J30.9 ALLERGIC RHINITIS, UNSPECIFIED SEASONALITY, UNSPECIFIED TRIGGER: Primary | ICD-10-CM

## 2023-05-12 LAB
ANION GAP IN SER/PLAS: 13 MMOL/L (ref 10–20)
CALCIUM (MG/DL) IN SER/PLAS: 9 MG/DL (ref 8.6–10.6)
CARBON DIOXIDE, TOTAL (MMOL/L) IN SER/PLAS: 26 MMOL/L (ref 21–32)
CHLORIDE (MMOL/L) IN SER/PLAS: 104 MMOL/L (ref 98–107)
CREATININE (MG/DL) IN SER/PLAS: 1.25 MG/DL (ref 0.5–1.3)
GFR MALE: 56 ML/MIN/1.73M2
GLUCOSE (MG/DL) IN SER/PLAS: 94 MG/DL (ref 74–99)
POTASSIUM (MMOL/L) IN SER/PLAS: 4.5 MMOL/L (ref 3.5–5.3)
SODIUM (MMOL/L) IN SER/PLAS: 138 MMOL/L (ref 136–145)
UREA NITROGEN (MG/DL) IN SER/PLAS: 18 MG/DL (ref 6–23)

## 2023-05-12 PROCEDURE — 3077F SYST BP >= 140 MM HG: CPT | Performed by: FAMILY MEDICINE

## 2023-05-12 PROCEDURE — 1159F MED LIST DOCD IN RCRD: CPT | Performed by: FAMILY MEDICINE

## 2023-05-12 PROCEDURE — 99215 OFFICE O/P EST HI 40 MIN: CPT | Performed by: FAMILY MEDICINE

## 2023-05-12 PROCEDURE — 96372 THER/PROPH/DIAG INJ SC/IM: CPT | Performed by: FAMILY MEDICINE

## 2023-05-12 PROCEDURE — 1160F RVW MEDS BY RX/DR IN RCRD: CPT | Performed by: FAMILY MEDICINE

## 2023-05-12 PROCEDURE — 3078F DIAST BP <80 MM HG: CPT | Performed by: FAMILY MEDICINE

## 2023-05-12 PROCEDURE — 1036F TOBACCO NON-USER: CPT | Performed by: FAMILY MEDICINE

## 2023-05-12 RX ORDER — HYDROCHLOROTHIAZIDE 12.5 MG/1
12.5 TABLET ORAL DAILY
Qty: 30 TABLET | Refills: 0 | Status: SHIPPED | OUTPATIENT
Start: 2023-05-12 | End: 2023-06-09

## 2023-05-12 RX ORDER — TRIAMCINOLONE ACETONIDE 40 MG/ML
40 INJECTION, SUSPENSION INTRA-ARTICULAR; INTRAMUSCULAR ONCE
Status: COMPLETED | OUTPATIENT
Start: 2023-05-12 | End: 2023-05-12

## 2023-05-12 RX ADMIN — TRIAMCINOLONE ACETONIDE 40 MG: 40 INJECTION, SUSPENSION INTRA-ARTICULAR; INTRAMUSCULAR at 10:23

## 2023-05-12 ASSESSMENT — PAIN SCALES - GENERAL: PAINLEVEL: 0-NO PAIN

## 2023-05-12 NOTE — PROGRESS NOTES
Subjective   Patient ID: Agustin Panda is a 88 y.o. male who presents for Results (Here to discuss blood work results regarding his kidneys), Allergic Rhinitis, and Edema.    HPI   Farhad was seen today for a follow-up and review of his recent labs, decreased kidney function, chronic lower extremity edema.  It has been a juggling act, using medications to help edema, but not decreasing kidney function.  His cardiomyopathy, ejection fraction certainly benefits from a diuretic.  He has been on furosemide and torsemide, which he believes helped his edema only a little.  He has been unable to comfortably wear compression stockings, has much difficulty getting them on and off.  GFR levels have been around 40 the last couple of checks.  He notes chronic dyspnea on exertion, no different off of his Lasix.  He has no orthopnea, paroxysmal nocturnal dyspnea.  Labs were reviewed, A1c 6.3.  Allergies have gotten severe, requests an injection.  Review of Systems  The full, 10+ multi-organ review of systems, is within normal limits with the exception of what is noted above in HPI.  Objective   /70 (BP Location: Right arm, Patient Position: Sitting, BP Cuff Size: Adult)   Pulse 61   Temp 36.6 °C (97.8 °F) (Temporal)   Resp 14   Wt 85 kg (187 lb 8 oz)   SpO2 94%   BMI 31.20 kg/m²     Physical Exam  Cardiac exam reveals a regular rate and rhythm,  murmur present.   Lungs are clear bilaterally.    No lower extremity edema present.  2+ left lower extremity edema to just below the knee noted.  1+ right lower extremity edema extends about two thirds up the tibia.  Both sides are pitting.  Assessment/Plan        We have had a difficult time balancing effective enough diuretic effect that his edema improves, and does not worsen his GFR.  He ideally needs some form of a diuretic given his cardiomyopathy, which has improved.  His readmit improved minimally off of furosemide.  Blood pressure however is much higher than normal.   I have recommended he continue losartan, try adding hydrochlorothiazide, to see if it gives him some edema benefit, without affecting GFR.    Because of his kidney function, I recommend discontinuing omeprazole, avoiding NSAIDs.  His A1c is sufficiently good that I would like him to stop his metformin for now as well.  We will recheck kidney function in about 3 weeks.    Continue all other medications.      Total time spent with patient, reviewing records, and completing charting was 40 minutes, over half of it spent counseling and/or coordinating care  **Portions of this medical record have been created using voice recognition software and may have minor errors which are inherent in voice recognition systems. It has not been fully edited for typographical or grammatical errors**

## 2023-05-12 NOTE — PATIENT INSTRUCTIONS
Since kidney function remains decreased, please stop omeprazole, stop metformin.    You still need better blood pressure control, start hydrochlorothiazide 12.5 mg every morning.  It is a mild diuretic.  Continue losartan    Recheck kidney function blood work in about 3 weeks, order is in the system.    Allergy shot given today  Call with new problems or concerns.

## 2023-05-15 NOTE — RESULT ENCOUNTER NOTE
Kidney function is now essentially normal.  Take the new diuretic prescribed at [facility] (hydrochlorothiazide), recheck kidney function in 3 weeks

## 2023-05-16 ENCOUNTER — TELEPHONE (OUTPATIENT)
Dept: PRIMARY CARE | Facility: CLINIC | Age: 88
End: 2023-05-16

## 2023-05-16 RX ORDER — OMEPRAZOLE 40 MG/1
40 CAPSULE, DELAYED RELEASE ORAL DAILY
COMMUNITY
End: 2024-01-17 | Stop reason: SDUPTHER

## 2023-05-16 NOTE — TELEPHONE ENCOUNTER
Pt called because he would like to restart a medication.  He would like to be put back on Omeprazole. Since stopping he has has issues with heartburn and acid reflex. If ok to restart he still has pills at home.

## 2023-06-02 ENCOUNTER — LAB (OUTPATIENT)
Dept: LAB | Facility: LAB | Age: 88
End: 2023-06-02
Payer: MEDICARE

## 2023-06-02 DIAGNOSIS — N28.9 DECREASED RENAL FUNCTION: ICD-10-CM

## 2023-06-02 PROCEDURE — 36415 COLL VENOUS BLD VENIPUNCTURE: CPT

## 2023-06-02 PROCEDURE — 80048 BASIC METABOLIC PNL TOTAL CA: CPT

## 2023-06-03 LAB
ANION GAP IN SER/PLAS: 16 MMOL/L (ref 10–20)
CALCIUM (MG/DL) IN SER/PLAS: 9.2 MG/DL (ref 8.6–10.6)
CARBON DIOXIDE, TOTAL (MMOL/L) IN SER/PLAS: 21 MMOL/L (ref 21–32)
CHLORIDE (MMOL/L) IN SER/PLAS: 105 MMOL/L (ref 98–107)
CREATININE (MG/DL) IN SER/PLAS: 1.3 MG/DL (ref 0.5–1.3)
GFR MALE: 53 ML/MIN/1.73M2
GLUCOSE (MG/DL) IN SER/PLAS: 109 MG/DL (ref 74–99)
POTASSIUM (MMOL/L) IN SER/PLAS: 4.8 MMOL/L (ref 3.5–5.3)
SODIUM (MMOL/L) IN SER/PLAS: 137 MMOL/L (ref 136–145)
UREA NITROGEN (MG/DL) IN SER/PLAS: 24 MG/DL (ref 6–23)

## 2023-06-09 DIAGNOSIS — I10 BENIGN ESSENTIAL HYPERTENSION: ICD-10-CM

## 2023-06-09 DIAGNOSIS — I42.9 CARDIOMYOPATHY, UNSPECIFIED TYPE (MULTI): ICD-10-CM

## 2023-06-09 RX ORDER — HYDROCHLOROTHIAZIDE 12.5 MG/1
TABLET ORAL
Qty: 3090 TABLET | Refills: 3 | Status: SHIPPED | OUTPATIENT
Start: 2023-06-09 | End: 2023-07-03

## 2023-07-03 ENCOUNTER — TELEPHONE (OUTPATIENT)
Dept: PRIMARY CARE | Facility: CLINIC | Age: 88
End: 2023-07-03
Payer: MEDICARE

## 2023-07-03 NOTE — TELEPHONE ENCOUNTER
Reconciled/removed hydrochlorothiazide from chart.  Metformin was not in Epic.  Patient is aware he will need to contact his pharmacy and ask for his auto-fill to be removed from his account and to stop mailing to him.

## 2023-07-03 NOTE — TELEPHONE ENCOUNTER
Pt called because the pharmacy keeps filling medications he is no longer taking. Pt would like Hydrochlorothiazide and Metformin removed from his chart. He is getting charged. He states he doesn't drive so it just shows up in his mailbox. KODY pt will forward to dianne.

## 2023-07-15 DIAGNOSIS — N13.8 BPH WITH OBSTRUCTION/LOWER URINARY TRACT SYMPTOMS: ICD-10-CM

## 2023-07-15 DIAGNOSIS — N40.1 BPH WITH OBSTRUCTION/LOWER URINARY TRACT SYMPTOMS: ICD-10-CM

## 2023-07-16 RX ORDER — TAMSULOSIN HYDROCHLORIDE 0.4 MG/1
0.8 CAPSULE ORAL NIGHTLY
Qty: 180 CAPSULE | Refills: 3 | Status: SHIPPED | OUTPATIENT
Start: 2023-07-16 | End: 2023-11-10 | Stop reason: SDUPTHER

## 2023-07-28 ENCOUNTER — TELEPHONE (OUTPATIENT)
Dept: PRIMARY CARE | Facility: CLINIC | Age: 88
End: 2023-07-28
Payer: MEDICARE

## 2023-07-28 NOTE — TELEPHONE ENCOUNTER
Patient called for a refill on his potassium I do not need it on his med list, if he is supposed to be taking it please send it to his local pharmacy.

## 2023-07-31 NOTE — TELEPHONE ENCOUNTER
Patient aware he should not be taking any longer.  He also asked about other refills and I let him know they were sent in for a year in April.

## 2023-08-30 ENCOUNTER — TELEPHONE (OUTPATIENT)
Dept: PRIMARY CARE | Facility: CLINIC | Age: 88
End: 2023-08-30

## 2023-08-30 ENCOUNTER — OFFICE VISIT (OUTPATIENT)
Dept: PRIMARY CARE | Facility: CLINIC | Age: 88
End: 2023-08-30
Payer: MEDICARE

## 2023-08-30 VITALS
BODY MASS INDEX: 31.15 KG/M2 | TEMPERATURE: 98.1 F | SYSTOLIC BLOOD PRESSURE: 161 MMHG | DIASTOLIC BLOOD PRESSURE: 81 MMHG | HEART RATE: 65 BPM | OXYGEN SATURATION: 97 % | RESPIRATION RATE: 16 BRPM | WEIGHT: 187.2 LBS

## 2023-08-30 DIAGNOSIS — E11.49 OTHER DIABETIC NEUROLOGICAL COMPLICATION ASSOCIATED WITH TYPE 2 DIABETES MELLITUS (MULTI): ICD-10-CM

## 2023-08-30 DIAGNOSIS — E11.40 TYPE 2 DIABETES MELLITUS WITH DIABETIC NEUROPATHY, WITHOUT LONG-TERM CURRENT USE OF INSULIN (MULTI): ICD-10-CM

## 2023-08-30 DIAGNOSIS — H35.3132 INTERMEDIATE STAGE NONEXUDATIVE AGE-RELATED MACULAR DEGENERATION OF BOTH EYES: ICD-10-CM

## 2023-08-30 DIAGNOSIS — E78.2 MIXED HYPERLIPIDEMIA: ICD-10-CM

## 2023-08-30 DIAGNOSIS — J01.90 ACUTE NON-RECURRENT SINUSITIS, UNSPECIFIED LOCATION: Primary | ICD-10-CM

## 2023-08-30 DIAGNOSIS — I10 BENIGN ESSENTIAL HYPERTENSION: ICD-10-CM

## 2023-08-30 PROCEDURE — 3077F SYST BP >= 140 MM HG: CPT | Performed by: FAMILY MEDICINE

## 2023-08-30 PROCEDURE — 1036F TOBACCO NON-USER: CPT | Performed by: FAMILY MEDICINE

## 2023-08-30 PROCEDURE — 99214 OFFICE O/P EST MOD 30 MIN: CPT | Performed by: FAMILY MEDICINE

## 2023-08-30 PROCEDURE — 1126F AMNT PAIN NOTED NONE PRSNT: CPT | Performed by: FAMILY MEDICINE

## 2023-08-30 PROCEDURE — 1159F MED LIST DOCD IN RCRD: CPT | Performed by: FAMILY MEDICINE

## 2023-08-30 PROCEDURE — 1160F RVW MEDS BY RX/DR IN RCRD: CPT | Performed by: FAMILY MEDICINE

## 2023-08-30 PROCEDURE — 3079F DIAST BP 80-89 MM HG: CPT | Performed by: FAMILY MEDICINE

## 2023-08-30 RX ORDER — AZELASTINE 1 MG/ML
1-2 SPRAY, METERED NASAL 2 TIMES DAILY
COMMUNITY

## 2023-08-30 RX ORDER — AMOXICILLIN AND CLAVULANATE POTASSIUM 875; 125 MG/1; MG/1
TABLET, FILM COATED ORAL
Qty: 14 TABLET | Refills: 0 | Status: SHIPPED | OUTPATIENT
Start: 2023-08-30 | End: 2023-09-12 | Stop reason: ALTCHOICE

## 2023-08-30 ASSESSMENT — PATIENT HEALTH QUESTIONNAIRE - PHQ9
9. THOUGHTS THAT YOU WOULD BE BETTER OFF DEAD, OR OF HURTING YOURSELF: NEARLY EVERY DAY
SUM OF ALL RESPONSES TO PHQ QUESTIONS 1-9: 12
5. POOR APPETITE OR OVEREATING: NOT AT ALL
6. FEELING BAD ABOUT YOURSELF - OR THAT YOU ARE A FAILURE OR HAVE LET YOURSELF OR YOUR FAMILY DOWN: NOT AT ALL
3. TROUBLE FALLING OR STAYING ASLEEP OR SLEEPING TOO MUCH: NEARLY EVERY DAY
4. FEELING TIRED OR HAVING LITTLE ENERGY: NEARLY EVERY DAY
1. LITTLE INTEREST OR PLEASURE IN DOING THINGS: NOT AT ALL
SUM OF ALL RESPONSES TO PHQ9 QUESTIONS 1 AND 2: 3
2. FEELING DOWN, DEPRESSED OR HOPELESS: NEARLY EVERY DAY
8. MOVING OR SPEAKING SO SLOWLY THAT OTHER PEOPLE COULD HAVE NOTICED. OR THE OPPOSITE, BEING SO FIGETY OR RESTLESS THAT YOU HAVE BEEN MOVING AROUND A LOT MORE THAN USUAL: NOT AT ALL
10. IF YOU CHECKED OFF ANY PROBLEMS, HOW DIFFICULT HAVE THESE PROBLEMS MADE IT FOR YOU TO DO YOUR WORK, TAKE CARE OF THINGS AT HOME, OR GET ALONG WITH OTHER PEOPLE: NOT DIFFICULT AT ALL
7. TROUBLE CONCENTRATING ON THINGS, SUCH AS READING THE NEWSPAPER OR WATCHING TELEVISION: NOT AT ALL

## 2023-08-30 ASSESSMENT — PAIN SCALES - GENERAL: PAINLEVEL: 0-NO PAIN

## 2023-08-30 NOTE — PROGRESS NOTES
Subjective   Patient ID: Agustin Panda is a 88 y.o. male who presents for Hypertension, Hyperlipidemia, Diabetes, Sinusitis (Lots of mucus all day long, all he does is spit stuff up), Headache, Foot Swelling (No pain left foot), and Shortness of Breath (Really bad when he is active).    HPI   Farhad was seen today for the above related concerns, medication review.  He overall feels well, has his usual chronic issues, including left lower extremity edema.  He has had a detailed work-up, including labs, ultrasound, echocardiogram, stress test, all are reassuring.  He has tried Lasix, torsemide, without significant relief being noted.  He does note shortness of breath intermittently, at times with exertion.  He uses 1 puff of albuterol, does seem to help.  He has not had recent pulmonary function tests.  He notes presence of purulent nasal discharge and sputum, without fever, sore throat, ear pain.  He does use Astelin, prescribed by ENT originally, wonders if it can be prescribed here.  Labs from last appointment checked, he is not quite due for an A1c.  He checks home blood pressures, typically run in the 130s/60s.  Review of Systems  The full, 10+ multi-organ review of systems, is within normal limits with the exception of what is noted above in HPI.  Objective   /81 (BP Location: Right arm, Patient Position: Sitting, BP Cuff Size: Adult)   Pulse 65   Temp 36.7 °C (98.1 °F) (Temporal)   Resp 16   Wt 84.9 kg (187 lb 3.2 oz)   SpO2 97%   BMI 31.15 kg/m²     Physical Exam  Constitutional/General appearance: alert, oriented, well-appearing, in no distress  Head and face exam is normal  No scleral icterus or conjunctival erythema present  Hearing is grossly normal  Respiratory effort is normal, no dyspnea noted  Cortical function is normal  Mood, affect, are pleasant, appropriate, and interactive.  Insight is normal  Cardiac exam reveals a regular rate and rhythm, murmur present  Lungs are clear  1+ left  lower extremity edema noted, trace on the right, pitting  Assessment/Plan     Hypertension, home blood pressures are excellent, continue current regimen.,  Follow home blood pressures, goal close to 130/80 or less most of the time.    Hyperlipidemia--- since lipid panels are/have been stable, I have recommended continuing the current regimen.  This includes a low fat/high-fiber diet, to include foods rich in natural Omega-3's, such as seafood, nuts, and olives, so long as allergies do not prohibit.  Exercise should be continued as able.  Refills were sent as needed.  We will continue followup on an every six-month basis, and will address further needs/issues should they arise.    Diabetes, not quite due for A1c, will check at next visit.    GERD--- symptoms remain well controlled on the current therapy, which I have recommended be continued.  No worrisome features are currently present.  Reflux precautions are important, including following a low fat/spice/caffeine/alcohol diet, and minimizing NSAIDs and aspirin.  Weight maintenance/loss measures will also be helpful.  Routine f/u will be continued.    Longstanding rosacea, discontinue doxycycline.    Sinusitis--- medication will be started today, as prescribed/noted above, and be taken until gone.  Additional rest and fluids will also be helpful, and activity should be as tolerated.  Over-the-counter analgesics may be taken on an as-needed basis.  Should the symptoms not improve over the next 5-7 days, the office should be contacted for additional advice.  I will refill Astelin as requested.    **Portions of this medical record have been created using voice recognition software and may have minor errors which are inherent in voice recognition systems. It has not been fully edited for typographical or grammatical errors**

## 2023-08-30 NOTE — PROGRESS NOTES
Jb phone the following and to discount drug [facility] in [place]:    Azelastine 137 mcg spray  Use 1 to 2 sprays twice daily as needed  #3 spray bottles/3 refills  Request good Rx discount please    (Cannot send via EMR)

## 2023-09-01 ENCOUNTER — TELEPHONE (OUTPATIENT)
Dept: PRIMARY CARE | Facility: CLINIC | Age: 88
End: 2023-09-01
Payer: MEDICARE

## 2023-09-01 NOTE — TELEPHONE ENCOUNTER
Patient called stating he needed a refill on his hydrochlorothiazide and I do not see it on his med list, if he should be taking it please send refills to his local pharmacy.

## 2023-09-01 NOTE — TELEPHONE ENCOUNTER
Per Dr. Carroll's last note, he wanted him on furosemide (Lasix) 20 mg daily.  Assuming he is still taking this, he does not need HCTZ.  If he has not been taking furosemide, I will send a prescription for HCTZ.

## 2023-09-11 ENCOUNTER — TELEPHONE (OUTPATIENT)
Dept: PRIMARY CARE | Facility: CLINIC | Age: 88
End: 2023-09-11
Payer: MEDICARE

## 2023-09-11 NOTE — TELEPHONE ENCOUNTER
Called patient states he was in with Dr. Sanchez on 8/30 and doesn't necessarily want to come in for another appointment at this time. Patient is wondering what can be done without coming back in.

## 2023-09-11 NOTE — TELEPHONE ENCOUNTER
The patient was in a couple weeks ago and given medication for symptoms. He states it did not help. He is still congested, pain that runs from the top of his head down the back of his neck. He wants to know what he should do?

## 2023-09-12 DIAGNOSIS — J01.90 ACUTE NON-RECURRENT SINUSITIS, UNSPECIFIED LOCATION: Primary | ICD-10-CM

## 2023-09-12 RX ORDER — LEVOFLOXACIN 500 MG/1
500 TABLET, FILM COATED ORAL DAILY
Qty: 7 TABLET | Refills: 0 | Status: SHIPPED | OUTPATIENT
Start: 2023-09-12 | End: 2023-09-21

## 2023-09-15 ENCOUNTER — TELEPHONE (OUTPATIENT)
Dept: PRIMARY CARE | Facility: CLINIC | Age: 88
End: 2023-09-15
Payer: MEDICARE

## 2023-09-15 DIAGNOSIS — R06.09 DYSPNEA ON EXERTION: Primary | ICD-10-CM

## 2023-09-15 NOTE — TELEPHONE ENCOUNTER
Patient was referred to cardiologist Dr. Carroll, was seen in his office yesterday.  Dr. Carroll states his heart is not the issue.    Patient reports Dr. Carroll is requesting a referral for patient to see pulmonary specialist due to continued shortness of breath    Patient would like our office to call back to schedule this

## 2023-09-21 ENCOUNTER — OFFICE VISIT (OUTPATIENT)
Dept: PRIMARY CARE | Facility: CLINIC | Age: 88
End: 2023-09-21
Payer: MEDICARE

## 2023-09-21 VITALS
HEART RATE: 51 BPM | DIASTOLIC BLOOD PRESSURE: 47 MMHG | BODY MASS INDEX: 31.52 KG/M2 | WEIGHT: 189.4 LBS | RESPIRATION RATE: 16 BRPM | SYSTOLIC BLOOD PRESSURE: 125 MMHG | TEMPERATURE: 98.2 F | OXYGEN SATURATION: 94 %

## 2023-09-21 DIAGNOSIS — E11.40 TYPE 2 DIABETES MELLITUS WITH DIABETIC NEUROPATHY, WITHOUT LONG-TERM CURRENT USE OF INSULIN (MULTI): ICD-10-CM

## 2023-09-21 DIAGNOSIS — J32.1 CHRONIC FRONTAL SINUSITIS: Primary | ICD-10-CM

## 2023-09-21 DIAGNOSIS — J32.0 CHRONIC MAXILLARY SINUSITIS: ICD-10-CM

## 2023-09-21 PROCEDURE — 1036F TOBACCO NON-USER: CPT | Performed by: FAMILY MEDICINE

## 2023-09-21 PROCEDURE — 1159F MED LIST DOCD IN RCRD: CPT | Performed by: FAMILY MEDICINE

## 2023-09-21 PROCEDURE — 99214 OFFICE O/P EST MOD 30 MIN: CPT | Performed by: FAMILY MEDICINE

## 2023-09-21 PROCEDURE — 90662 IIV NO PRSV INCREASED AG IM: CPT | Performed by: FAMILY MEDICINE

## 2023-09-21 PROCEDURE — 3078F DIAST BP <80 MM HG: CPT | Performed by: FAMILY MEDICINE

## 2023-09-21 PROCEDURE — 1126F AMNT PAIN NOTED NONE PRSNT: CPT | Performed by: FAMILY MEDICINE

## 2023-09-21 PROCEDURE — 1160F RVW MEDS BY RX/DR IN RCRD: CPT | Performed by: FAMILY MEDICINE

## 2023-09-21 PROCEDURE — 3074F SYST BP LT 130 MM HG: CPT | Performed by: FAMILY MEDICINE

## 2023-09-21 PROCEDURE — G0008 ADMIN INFLUENZA VIRUS VAC: HCPCS | Performed by: FAMILY MEDICINE

## 2023-09-21 RX ORDER — FUROSEMIDE 20 MG/1
1 TABLET ORAL DAILY
COMMUNITY
Start: 2023-03-09 | End: 2024-04-04 | Stop reason: SDUPTHER

## 2023-09-21 RX ORDER — DULOXETIN HYDROCHLORIDE 20 MG/1
CAPSULE, DELAYED RELEASE ORAL
Qty: 30 CAPSULE | Refills: 1 | Status: SHIPPED | OUTPATIENT
Start: 2023-09-21 | End: 2023-11-22 | Stop reason: SDUPTHER

## 2023-09-21 RX ORDER — TRIAMCINOLONE ACETONIDE 40 MG/ML
80 INJECTION, SUSPENSION INTRA-ARTICULAR; INTRAMUSCULAR ONCE
Status: SHIPPED | OUTPATIENT
Start: 2023-09-21

## 2023-09-21 NOTE — PROGRESS NOTES
Subjective   Patient ID: Agustin Panda is a 88 y.o. male who presents for Headache and Facial Pain (X 3 weeks/Pt denies fever, nausea, vomiting, and CP).    HPI     Review of Systems    Objective   There were no vitals taken for this visit.    Physical Exam    Assessment/Plan

## 2023-09-21 NOTE — PROGRESS NOTES
Subjective   Patient ID: Agustin Panda is a 88 y.o. male who presents for Headache, Facial Pain (X 3 weeks/Pt denies fever, nausea, vomiting, and CP), and Cough (Productive/).    HPI   Farhad was seen today for a follow-up and review of his persistent sinusitis symptoms.  He has had 2 rounds of antibiotics, most recently Levaquin, without significant relief being noted.  He does have a history of chronic sinusitis, has seen Dr. Caba/ENT, does have a history of nasal polyps, though felt to be insufficiently sized to warrant surgery.  He has tried numerous inhaled corticosteroid sprays in the past, without significant relief.  He does take Astelin, as well as Singulair.  Medication(s) are being taken and tolerated as prescribed, without concerns, list reconciled today.  There are no complaints of chest pain, shortness of breath, lower extremity edema, or exertional concerns  He continues to have bothersome lower extremity neuropathy, has had insufficient relief in the past (or side effects) from Lyrica and gabapentin  Review of Systems  The full, 10+ multi-organ review of systems, is within normal limits with the exception of what is noted above in HPI.  Objective   BP (!) 125/47 (BP Location: Right arm, Patient Position: Sitting, BP Cuff Size: Adult)   Pulse 51   Temp 36.8 °C (98.2 °F)   Resp 16   Wt 85.9 kg (189 lb 6.4 oz)   SpO2 94%   BMI 31.52 kg/m²     Physical Exam  Cardiac exam reveals a regular rate and rhythm,  murmur present.   Lungs are clear bilaterally.    No lower extremity edema present.  Constitutional/General appearance: alert, oriented, well-appearing, in no distress  Head and face exam is normal  No scleral icterus or conjunctival erythema present  Hearing is grossly normal  Respiratory effort is normal, no dyspnea noted  Cortical function is normal  Mood, affect, are pleasant, appropriate, and interactive.  Insight is normal  Boggy, inflamed nasal turbinates and mucosa, with purulent D/C  bilaterally  Ear exams reveal scant cerumen bilaterally, TMs are normal  No neck lymphadenopathy present  Posterior pharynx exam is reassuring, scant postnasal discharge present  Assessment/Plan     Persistent sinusitis symptoms, status post 2 rounds of antibiotics, most recently Levaquin.  I do not think that additional antibiotics are warranted.  Kenalog 80 mg IM.  If not sufficiently improved soon, commend CT scan of the sinuses, returning to [facility]    Keep routine follow-up    Total time spent with patient, reviewing records, and completing charting was 30 minutes, over half of it spent counseling and/or coordinating care  **Portions of this medical record have been created using voice recognition software and may have minor errors which are inherent in voice recognition systems. It has not been fully edited for typographical or grammatical errors**

## 2023-10-12 ENCOUNTER — APPOINTMENT (OUTPATIENT)
Dept: PRIMARY CARE | Facility: CLINIC | Age: 88
End: 2023-10-12
Payer: MEDICARE

## 2023-10-13 DIAGNOSIS — E55.9 VITAMIN D DEFICIENCY: ICD-10-CM

## 2023-10-13 RX ORDER — CHOLECALCIFEROL (VITAMIN D3) 125 MCG
CAPSULE ORAL
Qty: 90 CAPSULE | Refills: 3 | Status: SHIPPED | OUTPATIENT
Start: 2023-10-13 | End: 2024-01-26 | Stop reason: SDUPTHER

## 2023-10-18 DIAGNOSIS — J45.30 MILD PERSISTENT REACTIVE AIRWAY DISEASE WITHOUT COMPLICATION (HHS-HCC): ICD-10-CM

## 2023-10-18 RX ORDER — MONTELUKAST SODIUM 10 MG/1
10 TABLET ORAL EVERY EVENING
Qty: 90 TABLET | Refills: 3 | Status: SHIPPED | OUTPATIENT
Start: 2023-10-18 | End: 2024-04-04 | Stop reason: SDUPTHER

## 2023-11-10 DIAGNOSIS — N13.8 BPH WITH OBSTRUCTION/LOWER URINARY TRACT SYMPTOMS: ICD-10-CM

## 2023-11-10 DIAGNOSIS — N40.1 BPH WITH OBSTRUCTION/LOWER URINARY TRACT SYMPTOMS: ICD-10-CM

## 2023-11-10 RX ORDER — TAMSULOSIN HYDROCHLORIDE 0.4 MG/1
0.8 CAPSULE ORAL NIGHTLY
Qty: 180 CAPSULE | Refills: 3 | Status: SHIPPED | OUTPATIENT
Start: 2023-11-10 | End: 2024-01-24 | Stop reason: DRUGHIGH

## 2023-11-16 DIAGNOSIS — E78.49 OTHER HYPERLIPIDEMIA: ICD-10-CM

## 2023-11-16 DIAGNOSIS — E11.40 TYPE 2 DIABETES MELLITUS WITH DIABETIC NEUROPATHY, WITHOUT LONG-TERM CURRENT USE OF INSULIN (MULTI): ICD-10-CM

## 2023-11-16 DIAGNOSIS — N40.1 BPH WITH OBSTRUCTION/LOWER URINARY TRACT SYMPTOMS: ICD-10-CM

## 2023-11-16 DIAGNOSIS — N13.8 BPH WITH OBSTRUCTION/LOWER URINARY TRACT SYMPTOMS: ICD-10-CM

## 2023-11-17 RX ORDER — FINASTERIDE 5 MG/1
5 TABLET, FILM COATED ORAL DAILY
Qty: 90 TABLET | Refills: 1 | OUTPATIENT
Start: 2023-11-17

## 2023-11-24 RX ORDER — DULOXETIN HYDROCHLORIDE 20 MG/1
CAPSULE, DELAYED RELEASE ORAL
Qty: 90 CAPSULE | Refills: 1 | Status: SHIPPED | OUTPATIENT
Start: 2023-11-24 | End: 2024-03-21 | Stop reason: ALTCHOICE

## 2023-11-24 RX ORDER — LOVASTATIN 40 MG/1
40 TABLET ORAL
Qty: 90 TABLET | Refills: 1 | Status: SHIPPED | OUTPATIENT
Start: 2023-11-24

## 2024-01-17 DIAGNOSIS — K21.9 GASTROESOPHAGEAL REFLUX DISEASE, UNSPECIFIED WHETHER ESOPHAGITIS PRESENT: ICD-10-CM

## 2024-01-17 RX ORDER — OMEPRAZOLE 40 MG/1
40 CAPSULE, DELAYED RELEASE ORAL DAILY
Qty: 90 CAPSULE | Refills: 3 | Status: SHIPPED | OUTPATIENT
Start: 2024-01-17

## 2024-01-23 ENCOUNTER — LAB (OUTPATIENT)
Dept: LAB | Facility: LAB | Age: 89
End: 2024-01-23
Payer: MEDICARE

## 2024-01-23 ENCOUNTER — OFFICE VISIT (OUTPATIENT)
Dept: PRIMARY CARE | Facility: CLINIC | Age: 89
End: 2024-01-23
Payer: MEDICARE

## 2024-01-23 DIAGNOSIS — I42.9 CARDIOMYOPATHY, UNSPECIFIED TYPE (MULTI): ICD-10-CM

## 2024-01-23 DIAGNOSIS — E78.2 MIXED HYPERLIPIDEMIA: ICD-10-CM

## 2024-01-23 DIAGNOSIS — E11.40 TYPE 2 DIABETES MELLITUS WITH DIABETIC NEUROPATHY, WITHOUT LONG-TERM CURRENT USE OF INSULIN (MULTI): ICD-10-CM

## 2024-01-23 DIAGNOSIS — Z00.00 MEDICARE ANNUAL WELLNESS VISIT, SUBSEQUENT: ICD-10-CM

## 2024-01-23 DIAGNOSIS — I10 BENIGN ESSENTIAL HYPERTENSION: Primary | ICD-10-CM

## 2024-01-23 DIAGNOSIS — E55.9 VITAMIN D DEFICIENCY: ICD-10-CM

## 2024-01-23 DIAGNOSIS — I10 BENIGN ESSENTIAL HYPERTENSION: ICD-10-CM

## 2024-01-23 PROCEDURE — 1126F AMNT PAIN NOTED NONE PRSNT: CPT | Performed by: FAMILY MEDICINE

## 2024-01-23 PROCEDURE — 80053 COMPREHEN METABOLIC PANEL: CPT

## 2024-01-23 PROCEDURE — 36415 COLL VENOUS BLD VENIPUNCTURE: CPT

## 2024-01-23 PROCEDURE — 80061 LIPID PANEL: CPT

## 2024-01-23 PROCEDURE — 82570 ASSAY OF URINE CREATININE: CPT

## 2024-01-23 PROCEDURE — 83036 HEMOGLOBIN GLYCOSYLATED A1C: CPT

## 2024-01-23 PROCEDURE — 1036F TOBACCO NON-USER: CPT | Performed by: FAMILY MEDICINE

## 2024-01-23 PROCEDURE — 81001 URINALYSIS AUTO W/SCOPE: CPT

## 2024-01-23 PROCEDURE — G0439 PPPS, SUBSEQ VISIT: HCPCS | Performed by: FAMILY MEDICINE

## 2024-01-23 PROCEDURE — 99214 OFFICE O/P EST MOD 30 MIN: CPT | Performed by: FAMILY MEDICINE

## 2024-01-23 PROCEDURE — 82043 UR ALBUMIN QUANTITATIVE: CPT

## 2024-01-23 PROCEDURE — 1160F RVW MEDS BY RX/DR IN RCRD: CPT | Performed by: FAMILY MEDICINE

## 2024-01-23 PROCEDURE — 3077F SYST BP >= 140 MM HG: CPT | Performed by: FAMILY MEDICINE

## 2024-01-23 PROCEDURE — 85025 COMPLETE CBC W/AUTO DIFF WBC: CPT

## 2024-01-23 PROCEDURE — 1170F FXNL STATUS ASSESSED: CPT | Performed by: FAMILY MEDICINE

## 2024-01-23 PROCEDURE — 1159F MED LIST DOCD IN RCRD: CPT | Performed by: FAMILY MEDICINE

## 2024-01-23 PROCEDURE — 3078F DIAST BP <80 MM HG: CPT | Performed by: FAMILY MEDICINE

## 2024-01-23 RX ORDER — LOSARTAN POTASSIUM AND HYDROCHLOROTHIAZIDE 12.5; 1 MG/1; MG/1
1 TABLET ORAL DAILY
Qty: 90 TABLET | Refills: 3 | Status: SHIPPED | OUTPATIENT
Start: 2024-01-23 | End: 2024-03-18

## 2024-01-23 ASSESSMENT — ACTIVITIES OF DAILY LIVING (ADL)
DRESSING: INDEPENDENT
BATHING: INDEPENDENT
MANAGING_FINANCES: INDEPENDENT
DOING_HOUSEWORK: INDEPENDENT
TAKING_MEDICATION: INDEPENDENT
GROCERY_SHOPPING: INDEPENDENT

## 2024-01-23 ASSESSMENT — PATIENT HEALTH QUESTIONNAIRE - PHQ9
SUM OF ALL RESPONSES TO PHQ9 QUESTIONS 1 AND 2: 0
2. FEELING DOWN, DEPRESSED OR HOPELESS: NOT AT ALL
1. LITTLE INTEREST OR PLEASURE IN DOING THINGS: NOT AT ALL

## 2024-01-23 NOTE — PROGRESS NOTES
"Subjective   Patient ID: Agustin Panda is a 88 y.o. male who presents for Medicare Annual Wellness Visit Subsequent.    [facility]   Farhad was seen today for a routine follow-up of his medications, as well as an annual Medicare visit review.  Medication(s) are being taken and tolerated as prescribed, without concerns, list reconciled today.  There are no complaints of chest pain, shortness of breath, lower extremity edema, or exertional concerns  Home blood pressures in recent days have been 157/78, 165/72, 172/72 on initial check today.  Review of Systems  The full review of systems is negative with the exception of what is noted in [facility]    Objective   /72 (BP Location: Right arm, Patient Position: Sitting, BP Cuff Size: Adult)   Pulse 63   Temp 36 °C (96.8 °F) (Temporal)   Ht 1.651 m (5' 5\")   Wt 79.7 kg (175 lb 11.2 oz)   SpO2 96%   BMI 29.24 kg/m²     Physical Exam  Cardiac exam reveals a regular rate and rhythm, murmur present.  Lungs are clear, trace bilateral lower extremity edema present  Constitutional/General appearance: alert, oriented, well-appearing, in no distress  Head and face exam is normal  No scleral icterus or conjunctival erythema present  Hearing is grossly normal  Respiratory effort is normal, no dyspnea noted  Cortical function is normal  Mood, affect, are pleasant, appropriate, and interactive.  Insight is normal    Assessment/Plan     Hypertension, need better control, add hydrochlorothiazide to current 100 mg losartan, continue metoprolol.  Keep cardiology follow-up, check labs today    Medicare gaps:  Tdap, pneumonia, flu vaccines all up-to-date.  Recommend Shingrix series, at local pharmacy.  He has no indication for colon or prostate cancer screening nor a AAA ultrasound.    Type II diabetes mellitus--- since the HBA1C's are/have remained stable, the current plan will be continued, including prescription medication (refilled as noted), a low carbohydrate diet, as well as regular " exercise as tolerated.  I have recommended the next followup be in 6 months.  The importance of an annual dilated diabetic eye exam, annual urine for microalbumin, an annual foot exam, as well as signs and symptoms of hypoglycemia and peripheral neuropathy were stressed.  Routine self examination of the feet will be continued.  Labs will be assessed on at least a twice yearly basis.    Hyperlipidemia--- since lipid panels are/have been stable, I have recommended continuing the current regimen.  This includes a low fat/high-fiber diet, to include foods rich in natural Omega-3's, such as seafood, nuts, and olives, so long as allergies do not prohibit.  Exercise should be continued as able.  Refills were sent as needed.  We will continue followup on an every six-month basis, and will address further needs/issues should they arise.    Continue all other medications    Follow-up in 6-month  **Portions of this medical record have been created using voice recognition software and may have minor errors which are inherent in voice recognition systems. It has not been fully edited for typographical or grammatical errors**

## 2024-01-24 ENCOUNTER — TELEPHONE (OUTPATIENT)
Dept: PRIMARY CARE | Facility: CLINIC | Age: 89
End: 2024-01-24

## 2024-01-24 DIAGNOSIS — N40.1 BPH WITH OBSTRUCTION/LOWER URINARY TRACT SYMPTOMS: ICD-10-CM

## 2024-01-24 DIAGNOSIS — R80.9 TYPE 2 DIABETES MELLITUS WITH PROTEINURIA (MULTI): Primary | ICD-10-CM

## 2024-01-24 DIAGNOSIS — E11.29 TYPE 2 DIABETES MELLITUS WITH PROTEINURIA (MULTI): Primary | ICD-10-CM

## 2024-01-24 DIAGNOSIS — N13.8 BPH WITH OBSTRUCTION/LOWER URINARY TRACT SYMPTOMS: ICD-10-CM

## 2024-01-24 LAB
ALBUMIN SERPL BCP-MCNC: 4.3 G/DL (ref 3.4–5)
ALP SERPL-CCNC: 61 U/L (ref 33–136)
ALT SERPL W P-5'-P-CCNC: 17 U/L (ref 10–52)
ANION GAP SERPL CALC-SCNC: 14 MMOL/L (ref 10–20)
APPEARANCE UR: ABNORMAL
AST SERPL W P-5'-P-CCNC: 17 U/L (ref 9–39)
BASOPHILS # BLD AUTO: 0.04 X10*3/UL (ref 0–0.1)
BASOPHILS NFR BLD AUTO: 0.6 %
BILIRUB SERPL-MCNC: 0.7 MG/DL (ref 0–1.2)
BILIRUB UR STRIP.AUTO-MCNC: NEGATIVE MG/DL
BUN SERPL-MCNC: 25 MG/DL (ref 6–23)
CALCIUM SERPL-MCNC: 9.4 MG/DL (ref 8.6–10.6)
CHLORIDE SERPL-SCNC: 103 MMOL/L (ref 98–107)
CHOLEST SERPL-MCNC: 120 MG/DL (ref 0–199)
CHOLESTEROL/HDL RATIO: 2.7
CO2 SERPL-SCNC: 25 MMOL/L (ref 21–32)
COLOR UR: YELLOW
CREAT SERPL-MCNC: 0.92 MG/DL (ref 0.5–1.3)
CREAT UR-MCNC: 140 MG/DL (ref 20–370)
EGFRCR SERPLBLD CKD-EPI 2021: 81 ML/MIN/1.73M*2
EOSINOPHIL # BLD AUTO: 0.11 X10*3/UL (ref 0–0.4)
EOSINOPHIL NFR BLD AUTO: 1.5 %
ERYTHROCYTE [DISTWIDTH] IN BLOOD BY AUTOMATED COUNT: 14.3 % (ref 11.5–14.5)
EST. AVERAGE GLUCOSE BLD GHB EST-MCNC: 126 MG/DL
GLUCOSE SERPL-MCNC: 107 MG/DL (ref 74–99)
GLUCOSE UR STRIP.AUTO-MCNC: NEGATIVE MG/DL
HBA1C MFR BLD: 6 %
HCT VFR BLD AUTO: 37 % (ref 41–52)
HDLC SERPL-MCNC: 45.1 MG/DL
HGB BLD-MCNC: 12.1 G/DL (ref 13.5–17.5)
IMM GRANULOCYTES # BLD AUTO: 0.04 X10*3/UL (ref 0–0.5)
IMM GRANULOCYTES NFR BLD AUTO: 0.6 % (ref 0–0.9)
KETONES UR STRIP.AUTO-MCNC: NEGATIVE MG/DL
LDLC SERPL CALC-MCNC: 49 MG/DL
LEUKOCYTE ESTERASE UR QL STRIP.AUTO: NEGATIVE
LYMPHOCYTES # BLD AUTO: 1.31 X10*3/UL (ref 0.8–3)
LYMPHOCYTES NFR BLD AUTO: 18.2 %
MCH RBC QN AUTO: 30.5 PG (ref 26–34)
MCHC RBC AUTO-ENTMCNC: 32.7 G/DL (ref 32–36)
MCV RBC AUTO: 93 FL (ref 80–100)
MICROALBUMIN UR-MCNC: 552 MG/L
MICROALBUMIN/CREAT UR: 394.3 UG/MG CREAT
MONOCYTES # BLD AUTO: 0.76 X10*3/UL (ref 0.05–0.8)
MONOCYTES NFR BLD AUTO: 10.5 %
MUCOUS THREADS #/AREA URNS AUTO: NORMAL /LPF
NEUTROPHILS # BLD AUTO: 4.95 X10*3/UL (ref 1.6–5.5)
NEUTROPHILS NFR BLD AUTO: 68.6 %
NITRITE UR QL STRIP.AUTO: NEGATIVE
NON HDL CHOLESTEROL: 75 MG/DL (ref 0–149)
NRBC BLD-RTO: 0 /100 WBCS (ref 0–0)
PH UR STRIP.AUTO: 5 [PH]
PLATELET # BLD AUTO: 221 X10*3/UL (ref 150–450)
POTASSIUM SERPL-SCNC: 4.1 MMOL/L (ref 3.5–5.3)
PROT SERPL-MCNC: 6.5 G/DL (ref 6.4–8.2)
PROT UR STRIP.AUTO-MCNC: ABNORMAL MG/DL
RBC # BLD AUTO: 3.97 X10*6/UL (ref 4.5–5.9)
RBC # UR STRIP.AUTO: NEGATIVE /UL
RBC #/AREA URNS AUTO: NORMAL /HPF
SODIUM SERPL-SCNC: 138 MMOL/L (ref 136–145)
SP GR UR STRIP.AUTO: 1.02
TRIGL SERPL-MCNC: 128 MG/DL (ref 0–149)
UROBILINOGEN UR STRIP.AUTO-MCNC: <2 MG/DL
VLDL: 26 MG/DL (ref 0–40)
WBC # BLD AUTO: 7.2 X10*3/UL (ref 4.4–11.3)
WBC #/AREA URNS AUTO: NORMAL /HPF

## 2024-01-24 RX ORDER — TAMSULOSIN HYDROCHLORIDE 0.4 MG/1
0.4 CAPSULE ORAL NIGHTLY
Qty: 90 CAPSULE | Refills: 3 | Status: SHIPPED | OUTPATIENT
Start: 2024-01-24

## 2024-01-24 RX ORDER — CHOLECALCIFEROL (VITAMIN D3) 125 MCG
CAPSULE ORAL
Qty: 90 CAPSULE | Refills: 3 | Status: CANCELLED | OUTPATIENT
Start: 2024-01-24

## 2024-01-24 NOTE — TELEPHONE ENCOUNTER
Can safely take 1 tamsulosin at night, unless dizziness when standing occurs.  Yes, should still take lovastatin

## 2024-01-24 NOTE — TELEPHONE ENCOUNTER
Pt calling seen yesterday and said he was put on Losartan hydrochlorothiazide.     Asking if he should still be taking the Tamsulosin as well? He said a year ago Dr Alvarez told him to only take 1/2 tablet.     Please verify also Still showing Lovastatin 40 mg on active med list.  Please verify and call pt.

## 2024-01-25 NOTE — RESULT ENCOUNTER NOTE
A1c is 6.0, reflecting excellent control of sugars.  Cholesterol panel is perfect  Urine testing shows protein in the urine, which means stress on the kidneys from many years of diabetes.  To better quantify, I recommend a 24-hour urine collection, where every drop of urine is collected for a 24-hour period then analyzed for total protein amount.  Order placed, he will need to  kit.

## 2024-01-26 DIAGNOSIS — E55.9 VITAMIN D DEFICIENCY: ICD-10-CM

## 2024-01-26 RX ORDER — CHOLECALCIFEROL (VITAMIN D3) 125 MCG
CAPSULE ORAL
Qty: 90 CAPSULE | Refills: 3 | Status: SHIPPED | OUTPATIENT
Start: 2024-01-26

## 2024-01-26 NOTE — TELEPHONE ENCOUNTER
Please authorize refill  Pt is no longer using [facility] and needs Rxs sent to [facility] (updated in chart)

## 2024-01-30 VITALS
OXYGEN SATURATION: 96 % | SYSTOLIC BLOOD PRESSURE: 165 MMHG | BODY MASS INDEX: 29.27 KG/M2 | HEART RATE: 63 BPM | TEMPERATURE: 96.8 F | DIASTOLIC BLOOD PRESSURE: 72 MMHG | HEIGHT: 65 IN | WEIGHT: 175.7 LBS

## 2024-01-31 ENCOUNTER — LAB (OUTPATIENT)
Dept: LAB | Facility: LAB | Age: 89
End: 2024-01-31
Payer: MEDICARE

## 2024-01-31 DIAGNOSIS — R80.9 TYPE 2 DIABETES MELLITUS WITH PROTEINURIA (MULTI): ICD-10-CM

## 2024-01-31 DIAGNOSIS — E11.29 TYPE 2 DIABETES MELLITUS WITH PROTEINURIA (MULTI): ICD-10-CM

## 2024-01-31 PROCEDURE — 82570 ASSAY OF URINE CREATININE: CPT

## 2024-01-31 PROCEDURE — 82043 UR ALBUMIN QUANTITATIVE: CPT

## 2024-01-31 PROCEDURE — 81050 URINALYSIS VOLUME MEASURE: CPT

## 2024-02-01 LAB
ALBUMIN (MG/24HR) IN 24 HOUR URINE: 184 MG/24H
COLLECT DURATION TIME SPEC: 24 HRS
CREAT 24H UR-MCNC: 55.8 MG/DL (ref 20–370)
CREAT 24H UR-MRATE: 0.84 G/24 H (ref 0.87–2.41)
MICROALBUMIN PANEL 24H UR: 122.4 MG/L
SPECIMEN VOL 24H UR: 1500 ML

## 2024-02-01 NOTE — RESULT ENCOUNTER NOTE
The amount of protein in his 24-hour urine sample is mildly elevated, better than expected, no change in medications needed.  Just need to continue to focus on good sugar and blood pressure control.

## 2024-03-14 ENCOUNTER — TELEPHONE (OUTPATIENT)
Dept: PRIMARY CARE | Facility: CLINIC | Age: 89
End: 2024-03-14

## 2024-03-14 NOTE — TELEPHONE ENCOUNTER
Called and spoke with Candy from [facility] and gave verbal order. She verbalized understanding.

## 2024-03-15 ENCOUNTER — PATIENT OUTREACH (OUTPATIENT)
Dept: PRIMARY CARE | Facility: CLINIC | Age: 89
End: 2024-03-15
Payer: MEDICARE

## 2024-03-15 NOTE — TELEPHONE ENCOUNTER
Pt calling said he has a hosp follow up with you on 03/21/24 and wanted to let you know the hosp took him off his hydrochlorothiazide and zesteril.   If you feel this is not ok please call pt to advise.  Patient was identified as a fall risk. Risk prevention instructions provided.

## 2024-03-15 NOTE — PATIENT INSTRUCTIONS

## 2024-03-15 NOTE — PROGRESS NOTES
Discharge Facility:   [facility]   Discharge Diagnosis:  Acute hypoxemic respiratory failure (HCC)   pneumonia and COPD exacerbation   Admission Date:  3/12/24   Discharge Date:  3/14/24     PCP Appointment Date:   3/21/24   Specialist Appointment Date: Dr Menjivar pulmonary in 2 weeks   Hospital Encounter and Summary: Linked   See discharge assessment below for further details    NEW SCRIPTS  ZITHROMAX, OMNICEF, MUCINEX, PREDNISONE, ALBUTEROL,  WALKER,     MEDS DISCONTINUED - METFORMIN, FLOMAX, LISINOPRIL, hydrochlorothiazide, GABAPENTIN,     Engagement  Call Start Time: 1400 (3/15/2024  2:52 PM)    Medications  Medications reviewed with patient/caregiver?: Yes (3/15/2024  2:52 PM)  Does the patient have all medications ordered at discharge?: Yes (3/15/2024  2:52 PM)  Prescription Comments: NEW SCRIPTS  ZITHROMAX, OMNICEF, MUCINEX, PREDNISONE, ALBUTEROL,  WALKER, (3/15/2024  2:52 PM)  Is the patient taking all medications as directed (includes completed medication regime)?: Yes (3/15/2024  2:52 PM)  Care Management Interventions: Provided patient education (3/15/2024  2:52 PM)  Medication Comments: MEDS DISCONTINUED - METFORMIN, FLOMAX, LISINOPRIL, hydrochlorothiazide, GABAPENTIN, (3/15/2024  2:52 PM)    Appointments  Does the patient have a primary care provider?: Yes (3/15/2024  2:52 PM)  Care Management Interventions: Verified appointment date/time/provider (3/15/2024  2:52 PM)  Has the patient kept scheduled appointments due by today?: Yes (3/15/2024  2:52 PM)  Care Management Interventions: Advised to schedule with specialist (3/15/2024  2:52 PM)    Self Management  What is the home health agency?: [facility] (3/15/2024  2:52 PM)  Has home health visited the patient within 72 hours of discharge?: Call prior to 72 hours (3/15/2024 11:17 AM)  What Durable Medical Equipment (DME) was ordered?: ROLLATOR - [facility] - also going to discuss with PT (3/15/2024  2:52 PM)  Has all Durable Medical Equipment  (DME) been delivered?: No (3/15/2024  2:52 PM)  Follow Up Tasks: Durable Medical Equipment (DME) (3/15/2024  2:52 PM)    Patient Teaching  Does the patient have access to their discharge instructions?: Yes (3/15/2024  2:52 PM)  Care Management Interventions: Reviewed instructions with patient (3/15/2024  2:52 PM)  What is the patient's perception of their health status since discharge?: Improving (3/15/2024  2:52 PM)  Is the patient/caregiver able to teach back the hierarchy of who to call/visit for symptoms/problems? PCP, Specialist, Home Health nurse, Urgent Care, ED, 911: Yes (3/15/2024  2:52 PM)  Patient/Caregiver Education Comments: This CM spoke with pt via phone. Pt reports doing well at home since discharge. New meds reviewed. Pt denies CP and SOB. Patient denies any further discharge questions/needs at this time. Emphasized that Follow up is needed after discharge to review the hospital recommendations,assess your response to your treatment. pt states [facility]  but unsure when they will call him to pick it up.  pt advised to talk with PT when they come out and call be back if he is unable to get walker,  pt doesnt drive. Pt aware of my availability for non-emergent concerns. Contact info provided to patient (3/15/2024  2:52 PM)

## 2024-03-18 DIAGNOSIS — I10 BENIGN ESSENTIAL HYPERTENSION: Primary | ICD-10-CM

## 2024-03-20 ENCOUNTER — TELEPHONE (OUTPATIENT)
Dept: PRIMARY CARE | Facility: CLINIC | Age: 89
End: 2024-03-20
Payer: MEDICARE

## 2024-03-20 RX ORDER — LISINOPRIL 10 MG/1
10 TABLET ORAL DAILY
Qty: 30 TABLET | Refills: 0 | Status: SHIPPED | OUTPATIENT
Start: 2024-03-20 | End: 2024-04-11 | Stop reason: SDUPTHER

## 2024-03-20 NOTE — TELEPHONE ENCOUNTER
Mai called in from [facility] to report BP    Today:   152/74  [id number]    137/68               24     139/69                 24     145/73      24     162/90      03/15/24     187/87       24    172/82       On his discharge paper the hospital took him off hydrochlorothiazide , lisinopril because the bp was to low. Since Saturday he has been taking 5 mg of Lisinopril daily        [phone]

## 2024-03-21 ENCOUNTER — OFFICE VISIT (OUTPATIENT)
Dept: PRIMARY CARE | Facility: CLINIC | Age: 89
End: 2024-03-21
Payer: MEDICARE

## 2024-03-21 VITALS
HEART RATE: 56 BPM | WEIGHT: 185.2 LBS | TEMPERATURE: 97.9 F | DIASTOLIC BLOOD PRESSURE: 68 MMHG | RESPIRATION RATE: 16 BRPM | BODY MASS INDEX: 30.82 KG/M2 | SYSTOLIC BLOOD PRESSURE: 142 MMHG | OXYGEN SATURATION: 96 %

## 2024-03-21 DIAGNOSIS — J18.9 PNEUMONIA OF BOTH LOWER LOBES DUE TO INFECTIOUS ORGANISM: ICD-10-CM

## 2024-03-21 DIAGNOSIS — N40.1 BPH WITH OBSTRUCTION/LOWER URINARY TRACT SYMPTOMS: ICD-10-CM

## 2024-03-21 DIAGNOSIS — E11.40 TYPE 2 DIABETES MELLITUS WITH DIABETIC NEUROPATHY, WITHOUT LONG-TERM CURRENT USE OF INSULIN (MULTI): Primary | ICD-10-CM

## 2024-03-21 DIAGNOSIS — Z00.00 HEALTH CARE MAINTENANCE: ICD-10-CM

## 2024-03-21 DIAGNOSIS — N13.8 BPH WITH OBSTRUCTION/LOWER URINARY TRACT SYMPTOMS: ICD-10-CM

## 2024-03-21 DIAGNOSIS — J96.01 ACUTE RESPIRATORY FAILURE WITH HYPOXIA (MULTI): ICD-10-CM

## 2024-03-21 PROCEDURE — 3078F DIAST BP <80 MM HG: CPT | Performed by: FAMILY MEDICINE

## 2024-03-21 PROCEDURE — 1036F TOBACCO NON-USER: CPT | Performed by: FAMILY MEDICINE

## 2024-03-21 PROCEDURE — 3077F SYST BP >= 140 MM HG: CPT | Performed by: FAMILY MEDICINE

## 2024-03-21 PROCEDURE — 1160F RVW MEDS BY RX/DR IN RCRD: CPT | Performed by: FAMILY MEDICINE

## 2024-03-21 PROCEDURE — 99495 TRANSJ CARE MGMT MOD F2F 14D: CPT | Performed by: FAMILY MEDICINE

## 2024-03-21 PROCEDURE — 1159F MED LIST DOCD IN RCRD: CPT | Performed by: FAMILY MEDICINE

## 2024-03-21 RX ORDER — PREDNISONE 10 MG/1
TABLET ORAL
COMMUNITY
Start: 2024-03-14 | End: 2024-03-21 | Stop reason: ALTCHOICE

## 2024-03-21 RX ORDER — GUAIFENESIN 600 MG/1
600 TABLET, EXTENDED RELEASE ORAL 2 TIMES DAILY
COMMUNITY
Start: 2024-03-14

## 2024-03-21 RX ORDER — FLUTICASONE FUROATE, UMECLIDINIUM BROMIDE AND VILANTEROL TRIFENATATE 200; 62.5; 25 UG/1; UG/1; UG/1
POWDER RESPIRATORY (INHALATION)
COMMUNITY
Start: 2024-02-05

## 2024-03-21 RX ORDER — DOCUSATE SODIUM 100 MG/1
100 CAPSULE, LIQUID FILLED ORAL EVERY 12 HOURS PRN
COMMUNITY
Start: 2024-03-16

## 2024-03-21 RX ORDER — DULOXETIN HYDROCHLORIDE 30 MG/1
30 CAPSULE, DELAYED RELEASE ORAL DAILY
Qty: 90 CAPSULE | Refills: 3 | Status: SHIPPED | OUTPATIENT
Start: 2024-03-21 | End: 2025-03-21

## 2024-03-21 RX ORDER — POLYETHYLENE GLYCOL 3350 17 G/17G
17 POWDER, FOR SOLUTION ORAL
COMMUNITY
Start: 2024-03-16

## 2024-03-21 RX ORDER — FINASTERIDE 5 MG/1
5 TABLET, FILM COATED ORAL
Qty: 90 TABLET | Refills: 3 | Status: SHIPPED | OUTPATIENT
Start: 2024-03-21

## 2024-03-21 NOTE — PROGRESS NOTES
"Subjective   Patient ID: Agustin Panda is a 89 y.o. male who presents for Hospital Follow-up (Acute hypoxemic respiratory failure, SOB, Pneumonia and COPD exacerbation /*Of Note: \"Your bps were on lower side in the hospital so your lisinopril and hydrochlorothiazide were held. Follow up with primary care with labwork before resuming.\").    HPI   Farhad was seen today for a hospital follow-up, was admitted to [facility] for a couple of days March 14, for acute respiratory failure secondary to pneumonia, underlying COPD.  Full records reviewed, including labs and imaging studies.  Pulse ox was 88% on admission, he was well-managed on 2 L of oxygen which was able to be weaned by discharge.  While hospitalized he was on ceftriaxone and azithromycin, discharged on cefdinir and azithromycin which he has finished.  He was also discharged with a prednisone taper, has 3 pills left, as well as Trelegy and instructions on albuterol.  Appetite has improved, though he still feels weak, has been using a rollator walker.  Home health care/therapy services were arranged, and are ongoing.  Sugars were fairly elevated while hospitalized and on steroids, have decreased and now to consistently less than 150.  Upon admission he was hypotensive, and his losartan/hydrochlorothiazide was held.  Since being home his blood pressures have increased, had been taking 5 mg of lisinopril, increased to 10 mg yesterday because of above goal blood pressures.  When he is well his baseline blood pressures are 130s/70    His peripheral neuropathy is still bothersome at times, wonders if his duloxetine can be increased.  He is currently on 20 mg.  Review of Systems  The full, 10+ multi-organ review of systems, is within normal limits with the exception of what is noted above in HPI.  Objective   /68 (BP Location: Left arm, Patient Position: Sitting, BP Cuff Size: Adult)   Pulse 56   Temp 36.6 °C (97.9 °F)   Resp 16   Wt 84 kg (185 lb 3.2 " oz)   SpO2 96%   BMI 30.82 kg/m²     Physical Exam  Lungs are clear, good air exchange noted, no wheezes rales or rhonchi present.  Cardiac exam reveals a regular rate and rhythm,  murmur present.   Lungs are clear bilaterally.    No lower extremity edema present.  He is comfortable, seated on his rollator walker in no acute distress.  Pulse ox normal.  Assessment/Plan     Status post pneumonia, acute respiratory failure, now satting well, off of antibiotics, about to finish prednisone.  He has follow-up with pulmonary on Monday, continue Trelegy and albuterol for now.    For diabetic neuropathy, we discussed increasing duloxetine to 30 mg daily, can increase further if needed.    Refills sent as needed  Continue to monitor home blood pressures, improved today.    Keep July 31 follow-up, call sooner if needed.    **Portions of this medical record have been created using voice recognition software and may have minor errors which are inherent in voice recognition systems. It has not been fully edited for typographical or grammatical errors**

## 2024-03-25 DIAGNOSIS — I10 BENIGN ESSENTIAL HYPERTENSION: ICD-10-CM

## 2024-03-25 DIAGNOSIS — J45.30 MILD PERSISTENT REACTIVE AIRWAY DISEASE WITHOUT COMPLICATION (HHS-HCC): ICD-10-CM

## 2024-03-25 NOTE — TELEPHONE ENCOUNTER
Isaac is calling from [facility] to report that this patient canceled a visit because he had other medical appointments today.     Isaac can be reached at [phone] with any questions or concerns.

## 2024-03-27 ENCOUNTER — PATIENT OUTREACH (OUTPATIENT)
Dept: PRIMARY CARE | Facility: CLINIC | Age: 89
End: 2024-03-27
Payer: MEDICARE

## 2024-03-27 NOTE — PROGRESS NOTES
Call regarding appt. with PCP on (3/21/24) after hospitalization.  At time of outreach call the patient feels as if their condition has (improved/) since last visit.  Reviewed the PCP appointment with the pt and addressed any questions or concerns.  Pt returned to ed for fall 3/25.  Pt states he has no concerns at this time/ Pt aware of my availability for non emergent concerns. Contact info provided to patient.

## 2024-04-03 PROBLEM — E78.5 HYPERLIPIDEMIA: Chronic | Status: ACTIVE | Noted: 2023-02-27

## 2024-04-03 PROBLEM — I34.0 MITRAL REGURGITATION: Chronic | Status: ACTIVE | Noted: 2023-02-27

## 2024-04-03 PROBLEM — J96.01 ACUTE HYPOXEMIC RESPIRATORY FAILURE (MULTI): Status: ACTIVE | Noted: 2024-03-13

## 2024-04-03 PROBLEM — R53.83 FATIGUE: Status: ACTIVE | Noted: 2024-04-03

## 2024-04-03 PROBLEM — H60.90 OTITIS EXTERNA: Status: ACTIVE | Noted: 2024-04-03

## 2024-04-03 PROBLEM — R05.9 COUGH: Status: ACTIVE | Noted: 2024-04-03

## 2024-04-03 PROBLEM — I49.3 FREQUENT PVCS: Chronic | Status: ACTIVE | Noted: 2023-02-27

## 2024-04-03 PROBLEM — I25.10 CAD (CORONARY ARTERY DISEASE): Chronic | Status: ACTIVE | Noted: 2023-02-27

## 2024-04-03 PROBLEM — R06.2 WHEEZING: Status: ACTIVE | Noted: 2024-04-03

## 2024-04-03 PROBLEM — E11.9 DIABETES MELLITUS (MULTI): Status: RESOLVED | Noted: 2023-02-27 | Resolved: 2024-04-03

## 2024-04-03 PROBLEM — R60.0 EDEMA OF LOWER EXTREMITY: Status: ACTIVE | Noted: 2024-04-03

## 2024-04-03 PROBLEM — J18.9 PNEUMONIA: Status: ACTIVE | Noted: 2024-03-13

## 2024-04-03 PROBLEM — E11.9 TYPE 2 DIABETES MELLITUS (MULTI): Chronic | Status: ACTIVE | Noted: 2019-07-11

## 2024-04-03 PROBLEM — I42.9 CARDIOMYOPATHY (MULTI): Chronic | Status: ACTIVE | Noted: 2023-02-27

## 2024-04-03 PROBLEM — N28.9 ABNORMAL KIDNEY FUNCTION: Status: ACTIVE | Noted: 2023-06-02

## 2024-04-04 DIAGNOSIS — I42.9 CARDIOMYOPATHY, UNSPECIFIED TYPE (MULTI): ICD-10-CM

## 2024-04-04 DIAGNOSIS — J45.30 MILD PERSISTENT REACTIVE AIRWAY DISEASE WITHOUT COMPLICATION (HHS-HCC): ICD-10-CM

## 2024-04-04 DIAGNOSIS — I10 BENIGN ESSENTIAL HYPERTENSION: Primary | ICD-10-CM

## 2024-04-04 RX ORDER — FUROSEMIDE 20 MG/1
20 TABLET ORAL DAILY
Qty: 90 TABLET | Refills: 3 | Status: SHIPPED | OUTPATIENT
Start: 2024-04-04

## 2024-04-04 RX ORDER — METOPROLOL SUCCINATE 50 MG/1
50 TABLET, EXTENDED RELEASE ORAL DAILY
Qty: 90 TABLET | Refills: 3 | Status: SHIPPED | OUTPATIENT
Start: 2024-04-04 | End: 2025-03-30

## 2024-04-04 RX ORDER — FUROSEMIDE 20 MG/1
20 TABLET ORAL DAILY
OUTPATIENT
Start: 2024-04-04

## 2024-04-04 RX ORDER — MONTELUKAST SODIUM 10 MG/1
10 TABLET ORAL EVERY EVENING
Qty: 90 TABLET | Refills: 3 | Status: SHIPPED | OUTPATIENT
Start: 2024-04-04

## 2024-04-04 RX ORDER — MONTELUKAST SODIUM 10 MG/1
10 TABLET ORAL EVERY EVENING
Qty: 90 TABLET | Refills: 3 | OUTPATIENT
Start: 2024-04-04

## 2024-04-04 RX ORDER — METOPROLOL SUCCINATE 100 MG/1
50 TABLET, EXTENDED RELEASE ORAL DAILY
Qty: 90 TABLET | Refills: 1 | OUTPATIENT
Start: 2024-04-04

## 2024-04-09 ENCOUNTER — TELEPHONE (OUTPATIENT)
Dept: PRIMARY CARE | Facility: CLINIC | Age: 89
End: 2024-04-09

## 2024-04-09 DIAGNOSIS — E11.40 TYPE 2 DIABETES MELLITUS WITH DIABETIC NEUROPATHY, WITHOUT LONG-TERM CURRENT USE OF INSULIN (MULTI): ICD-10-CM

## 2024-04-09 NOTE — TELEPHONE ENCOUNTER
Lori from [facility] home care is calling to report some BP for this patient     4/9 Before meds 179/92          After meds 160/80     4/8 Before meds 162/78     She said that the patient is having no issues and goes to see cardio on Thurs.   Lori can be reached at [phone] with any questions or concerns.

## 2024-04-10 PROBLEM — J30.9 ALLERGIC RHINITIS: Status: RESOLVED | Noted: 2023-02-27 | Resolved: 2024-04-10

## 2024-04-10 PROBLEM — J31.0 CHRONIC RHINITIS: Status: RESOLVED | Noted: 2023-02-27 | Resolved: 2024-04-10

## 2024-04-10 PROBLEM — H60.90 OTITIS EXTERNA: Status: RESOLVED | Noted: 2024-04-03 | Resolved: 2024-04-10

## 2024-04-10 PROBLEM — I10 HTN (HYPERTENSION): Chronic | Status: ACTIVE | Noted: 2019-07-11

## 2024-04-10 PROBLEM — R01.1 MURMUR: Status: RESOLVED | Noted: 2023-02-27 | Resolved: 2024-04-10

## 2024-04-10 PROBLEM — J32.9 CHRONIC SINUSITIS: Status: RESOLVED | Noted: 2023-02-27 | Resolved: 2024-04-10

## 2024-04-10 PROBLEM — H91.90 HOH (HARD OF HEARING): Status: RESOLVED | Noted: 2023-04-11 | Resolved: 2024-04-10

## 2024-04-10 PROBLEM — J33.8 MULTIPLE POLYPS OF ETHMOID SINUS: Status: RESOLVED | Noted: 2023-04-11 | Resolved: 2024-04-10

## 2024-04-10 PROBLEM — J01.90 ACUTE SINUSITIS: Status: RESOLVED | Noted: 2023-02-27 | Resolved: 2024-04-10

## 2024-04-10 PROBLEM — H69.90 DYSFUNCTION OF EUSTACHIAN TUBE: Status: RESOLVED | Noted: 2019-07-11 | Resolved: 2024-04-10

## 2024-04-11 ENCOUNTER — OFFICE VISIT (OUTPATIENT)
Dept: CARDIOLOGY | Facility: CLINIC | Age: 89
End: 2024-04-11
Payer: MEDICARE

## 2024-04-11 VITALS
WEIGHT: 185 LBS | BODY MASS INDEX: 30.82 KG/M2 | HEART RATE: 68 BPM | OXYGEN SATURATION: 97 % | DIASTOLIC BLOOD PRESSURE: 83 MMHG | SYSTOLIC BLOOD PRESSURE: 172 MMHG | HEIGHT: 65 IN

## 2024-04-11 DIAGNOSIS — I10 PRIMARY HYPERTENSION: Chronic | ICD-10-CM

## 2024-04-11 DIAGNOSIS — E78.2 MIXED HYPERLIPIDEMIA: Primary | Chronic | ICD-10-CM

## 2024-04-11 DIAGNOSIS — R06.09 DYSPNEA ON EXERTION: ICD-10-CM

## 2024-04-11 DIAGNOSIS — R60.0 EDEMA OF BOTH LOWER LEGS DUE TO PERIPHERAL VENOUS INSUFFICIENCY: ICD-10-CM

## 2024-04-11 DIAGNOSIS — I10 BENIGN ESSENTIAL HYPERTENSION: ICD-10-CM

## 2024-04-11 DIAGNOSIS — I42.0 DILATED CARDIOMYOPATHY (MULTI): Chronic | ICD-10-CM

## 2024-04-11 DIAGNOSIS — I87.2 EDEMA OF BOTH LOWER LEGS DUE TO PERIPHERAL VENOUS INSUFFICIENCY: ICD-10-CM

## 2024-04-11 DIAGNOSIS — Z09 HOSPITAL DISCHARGE FOLLOW-UP: ICD-10-CM

## 2024-04-11 PROCEDURE — 1159F MED LIST DOCD IN RCRD: CPT | Performed by: INTERNAL MEDICINE

## 2024-04-11 PROCEDURE — 1036F TOBACCO NON-USER: CPT | Performed by: INTERNAL MEDICINE

## 2024-04-11 PROCEDURE — 99214 OFFICE O/P EST MOD 30 MIN: CPT | Performed by: INTERNAL MEDICINE

## 2024-04-11 PROCEDURE — 3079F DIAST BP 80-89 MM HG: CPT | Performed by: INTERNAL MEDICINE

## 2024-04-11 PROCEDURE — 1160F RVW MEDS BY RX/DR IN RCRD: CPT | Performed by: INTERNAL MEDICINE

## 2024-04-11 PROCEDURE — 3077F SYST BP >= 140 MM HG: CPT | Performed by: INTERNAL MEDICINE

## 2024-04-11 RX ORDER — LISINOPRIL 10 MG/1
10 TABLET ORAL DAILY
Qty: 90 TABLET | Refills: 0 | Status: SHIPPED | OUTPATIENT
Start: 2024-04-11 | End: 2025-04-11

## 2024-04-11 RX ORDER — LISINOPRIL 10 MG/1
10 TABLET ORAL DAILY
Qty: 90 TABLET | Refills: 3 | Status: SHIPPED | OUTPATIENT
Start: 2024-04-11 | End: 2024-04-26 | Stop reason: SDUPTHER

## 2024-04-11 NOTE — PATIENT INSTRUCTIONS
1. Cardiomyopathy. EF 2020 was 45%. EF in 2021 is 50%. On the most recent echo his EF has normalized to 60 to 65%.  Minimal volume overload primarily of the left lower extremity which is chronic.  No orthopnea no PND.     2. Lower extremity edema.  Chronic.  He is currently on furosemide 20 mg daily.  Renal function is stable.    3. Hyperlipidemia.  1/23/2024 LDL 49 HDL 45 triglycerides 128.  Excellent results.  Followed by his primary care doctor    4. Hypertension.  Blood pressures are very high here.  At home when physical therapy comes it is typically in the 130s.    5. Shortness of breath. He has had this for a long time. He uses inhaler and it improves.  He is following up with his pulmonologist.  His BNP was 41.    Overall from a cardiac standpoint doing well.  My plan will be to see him back in 1 year.  Sooner if any issues arise

## 2024-04-11 NOTE — PROGRESS NOTES
Referred by No ref. provider found    HPI I am seeing Farhad for 7-month follow-up.  Admitted to the hospital with pneumonia.  Discharge.  He was following up with his pulmonologist at the [facility] when he tripped and fell.  Hit his head.  Brought back into the hospital March 25, 2024.  Since then he has been okay.  No chest pain no pressure no heaviness no shortness of breath.    Past Medical History:  Problem List Items Addressed This Visit    None       Past Medical History:   Diagnosis Date    Abrasion of unspecified upper arm, initial encounter 04/28/2015    Arm abrasion    Anxiety disorder, unspecified 07/06/2016    Anxiety and depression    CAD (coronary artery disease) 02/27/2023    Incidental cor calcification noted on CAT scan      Cardiomyopathy (CMS/HCC) 02/27/2023    EF 40-45% on echo 10/13/2020  50% on echo 10/19/2021  Now 60-65% (8/15/2023 echo)    Chronic sinusitis, unspecified 11/30/2016    Recurrent sinusitis    Encounter for screening for malignant neoplasm of prostate     Prostate cancer screening    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 Sanchez follows    Mitral regurgitation 02/27/2023    Mild to mod    Other conditions influencing health status     Normal colonoscopy    Other conditions influencing health status 11/08/2017    History of cough    Pain in left shoulder 07/06/2016    Left shoulder pain    Pain in right leg 12/22/2016    Bilateral leg pain    Personal history of other diseases of the respiratory system 11/08/2017    History of sinusitis    Personal history of other diseases of the respiratory system 12/01/2014    History of sinusitis    Type 2 diabetes mellitus (CMS/HCC) 07/11/2019             Past Surgical History:  He has a past surgical history that includes Other surgical history (02/13/2014); Hernia repair (02/13/2014); Other surgical history (05/21/2014); Other surgical history (09/30/2020);  and Other surgical history (09/30/2020).      Social History:  He reports that he has quit smoking. His smoking use included cigarettes. He has been exposed to tobacco smoke. He has never used smokeless tobacco. He reports that he does not currently use alcohol. He reports that he does not use drugs.    Family History:  Family History   Problem Relation Name Age of Onset    Heart disease Mother      Heart attack Father      Heart attack Brother          Allergies:  Amlodipine, Duloxetine, and Gabapentin    Outpatient Medications:  Current Outpatient Medications   Medication Instructions    azelastine (Astelin) 137 mcg (0.1 %) nasal spray 1-2 sprays, Each Nostril, 2 times daily    cholecalciferol (Vitamin D-3) 125 MCG (5000 UT) capsule TAKE 1 CAPSULE (5,000 UNITS) BY MOUTH ONCE EVERY DAY.    cyanocobalamin, vitamin B-12, (Vitamin B-12) 1,000 mcg tablet extended release 1 tablet, oral, Every Day    docusate sodium (COLACE) 100 mg, oral, Every 12 hours PRN    DULoxetine (CYMBALTA) 30 mg, oral, Daily, For nerve pain    finasteride (PROSCAR) 5 mg, oral, Every Day    furosemide (LASIX) 20 mg, oral, Daily    guaiFENesin (MUCINEX) 600 mg, oral, 2 times daily    lancets misc miscellaneous    lisinopril 10 mg, oral, Daily    lovastatin (MEVACOR) 40 mg, oral, Every Day    metoprolol succinate XL (TOPROL-XL) 50 mg, oral, Daily    montelukast (SINGULAIR) 10 mg, oral, Every evening    omeprazole (PRILOSEC) 40 mg, oral, Daily    OneTouch Ultra Test strip Test 1-2 times daily    polyethylene glycol (GLYCOLAX, MIRALAX) 17 g, oral, Daily RT    tamsulosin (FLOMAX) 0.4 mg, oral, Nightly    Trelegy Ellipta 200-62.5-25 mcg blister with device inhale 1 puff by mouth daily (never exceed this dose). rinse mouth after using it        Last Recorded Vitals:  There were no vitals filed for this visit.    Physical Exam    Physical  Patient is alert and oriented x3.  HEENT is unremarkable mucous members are moist  Neck no JVP no bruits upstrokes  are full no thyromegaly  Lungs are clear bilaterally.  No wheezing crackles or rales  Heart regular rhythm normal S1-S2 there is no S3 no murmurs are heard.  Abdomen is soft vessels are positive nontender nondistended no organomegaly no pulsatile masses  Extremities have 1+ left lower extremity edema.  Distal pulses present palpable.  Neuro is grossly nonfocal  Skin has no rashes     Last Labs:  CBC -  Lab Results   Component Value Date    WBC 7.2 01/23/2024    HGB 12.1 (L) 01/23/2024    HCT 37.0 (L) 01/23/2024    MCV 93 01/23/2024     01/23/2024       CMP -  Lab Results   Component Value Date    CALCIUM 9.4 01/23/2024    PROT 6.5 01/23/2024    ALBUMIN 4.3 01/23/2024    AST 17 01/23/2024    ALT 17 01/23/2024    ALKPHOS 61 01/23/2024    BILITOT 0.7 01/23/2024       LIPID PANEL -   Lab Results   Component Value Date    CHOL 120 01/23/2024    HDL 45.1 01/23/2024    CHHDL 2.7 01/23/2024    VLDL 26 01/23/2024    TRIG 128 01/23/2024    NHDL 75 01/23/2024       RENAL FUNCTION PANEL -   Lab Results   Component Value Date    K 4.1 01/23/2024       Lab Results   Component Value Date    BNP 41 06/03/2022    HGBA1C 6.0 (H) 01/23/2024          Procedure  ECHO [08/15/2023]: EF 60-65%. SD = impaired relaxation pattern.      HOLTER [08/26/2022]: SR, 42-89-55. No ep/of A-fib / PSVT / high-grade AV block. No VT.      VENOUS U/S [05/11/2022]: No DVT. Chronic changes vis'd in small saphenous vein of RLE. Lymph nodes in groin.     ECHO [10/19/2021]: Est EF 50%. SD = impaired relaxation pattern.      HOLTER [04/14/2021]: SR, 52-91-65. No ep/of A-fib / PSVT / high-grade AV block. Frequent PVCs w/short runs nonsust VT. Total PVC burden = 17.79%.     HOLTER [10/13/20 â€“ 10/15/20]: SR, -68. No ep/of A-fib / PSVT / high-grade AV block. Frequent PVCs (30,229 / 48 hours) w/runs of nonsustained VT up to 6 beats in duration.     ECHO [10/13/2020]: EF 40-45%. SD = impaired relaxation pattern. Mild mitral stenosis. Mild-mod MR. RVSP  elevated at 47.8 mmHg. Global hypok.      CT [09/19/2019]: Mx stable subcm lung nodules w/o suspicious new nodule. Mild medial RML opacity, favored to reflect infectious/inflammatory bronchiolitis including poss mild aspiration. Mildly prominent but stable mediastinal nodes. Bilat renal lesions. Marked coronary athero.      PHARM NST [07/12/2018]: No ev/of stress-induced ischemia or scar. Normal LV size w/est EF 57%.      CT [09/19/2017]: Scattered subcm noncalcified pulm nodules up to 6 mm.     Assessment/Plan   1. Cardiomyopathy. EF 2020 was 45%. EF in 2021 is 50%. On the most recent echo his EF has normalized to 60 to 65%.  Minimal volume overload primarily of the left lower extremity which is chronic.  No orthopnea no PND.     2. Lower extremity edema.  Chronic.  He is currently on furosemide 20 mg daily.  Renal function is stable.    3. Hyperlipidemia.  1/23/2024 LDL 49 HDL 45 triglycerides 128.  Excellent results.  Followed by his primary care doctor    4. Hypertension.  Blood pressures are very high here.  At home when physical therapy comes it is typically in the 130s.    5. Shortness of breath. He has had this for a long time. He uses inhaler and it improves.  He is following up with his pulmonologist.  His BNP was 41.    Overall from a cardiac standpoint doing well.  My plan will be to see him back in 1 year.  Sooner if any issues arise  Nigel Carroll MD     Instructions and follow up

## 2024-04-15 DIAGNOSIS — E11.40 TYPE 2 DIABETES MELLITUS WITH DIABETIC NEUROPATHY, WITHOUT LONG-TERM CURRENT USE OF INSULIN (MULTI): Primary | ICD-10-CM

## 2024-04-15 RX ORDER — LANCETS
EACH MISCELLANEOUS
Qty: 200 EACH | Refills: 3 | Status: SHIPPED | OUTPATIENT
Start: 2024-04-15

## 2024-04-17 RX ORDER — BLOOD SUGAR DIAGNOSTIC
STRIP MISCELLANEOUS
Qty: 180 STRIP | Refills: 1 | Status: CANCELLED | OUTPATIENT
Start: 2024-04-17

## 2024-04-18 DIAGNOSIS — E11.9 TYPE 2 DIABETES MELLITUS WITHOUT COMPLICATION, UNSPECIFIED WHETHER LONG TERM INSULIN USE (MULTI): Chronic | ICD-10-CM

## 2024-04-18 RX ORDER — BLOOD SUGAR DIAGNOSTIC
STRIP MISCELLANEOUS
Qty: 200 EACH | Refills: 1 | Status: SHIPPED | OUTPATIENT
Start: 2024-04-18

## 2024-04-26 ENCOUNTER — TELEPHONE (OUTPATIENT)
Dept: PRIMARY CARE | Facility: CLINIC | Age: 89
End: 2024-04-26
Payer: MEDICARE

## 2024-04-26 ENCOUNTER — PATIENT OUTREACH (OUTPATIENT)
Dept: PRIMARY CARE | Facility: CLINIC | Age: 89
End: 2024-04-26
Payer: MEDICARE

## 2024-04-26 DIAGNOSIS — E11.40 TYPE 2 DIABETES MELLITUS WITH DIABETIC NEUROPATHY, WITHOUT LONG-TERM CURRENT USE OF INSULIN (MULTI): Primary | ICD-10-CM

## 2024-04-26 NOTE — PROGRESS NOTES
Successful outreach to patient regarding hospitalization as patient continues TCM program.   At time of outreach call the patient feels as if their condition has (improved) since initial visit with PCP or specialist.  Pts is concerned about his BG. States it was 153 this am  had coffee and its 203. He stopped metoformin. Cm to send inbasket message to MA to see if she can discuss with pcp.  Pt aware of my availability for non emergent concerns. Contact info provided to patient.

## 2024-04-26 NOTE — TELEPHONE ENCOUNTER
Steroid injection can definitely increase blood sugars.  Effects usually wear off after a couple weeks.  Just drink plenty of water, would not add any medications at this time.  Focus on mostly vegetables and lean proteins in the meantime.

## 2024-04-26 NOTE — TELEPHONE ENCOUNTER
Pt stated since he got an injection in his knee he thinks cortisone   His sugars have been high   In the am 153 when he wakes up 2 hours later 254 only drinks coffee   In the afternoon 136     Please advise   [phone]

## 2024-04-30 ENCOUNTER — TELEPHONE (OUTPATIENT)
Dept: PRIMARY CARE | Facility: CLINIC | Age: 89
End: 2024-04-30
Payer: MEDICARE

## 2024-04-30 NOTE — TELEPHONE ENCOUNTER
Roselyn is calling from [facility] home care to let you know that she discharged him today 4/30/24.     She also said that he is in need of a new glucose monitor if you could send one to the  pharmacy in [place].     As well as his fasting blood sugars for the past two days have been 176  and 144     Roselyn can be reached at [phone] for any questions or concerns.

## 2024-05-02 RX ORDER — DEXTROSE 4 G
TABLET,CHEWABLE ORAL
Qty: 1 EACH | Refills: 1 | Status: SHIPPED | OUTPATIENT
Start: 2024-05-02

## 2024-05-07 ENCOUNTER — TELEPHONE (OUTPATIENT)
Dept: PRIMARY CARE | Facility: CLINIC | Age: 89
End: 2024-05-07
Payer: MEDICARE

## 2024-05-07 NOTE — TELEPHONE ENCOUNTER
Pt dropped off paperwork that needs filled out. Put in folder and placed in DJD box. Once filled out pt would like it mailed to his home.

## 2024-05-10 ENCOUNTER — TELEPHONE (OUTPATIENT)
Dept: PRIMARY CARE | Facility: CLINIC | Age: 89
End: 2024-05-10

## 2024-05-10 DIAGNOSIS — E11.40 TYPE 2 DIABETES MELLITUS WITH DIABETIC NEUROPATHY, WITHOUT LONG-TERM CURRENT USE OF INSULIN (MULTI): Primary | ICD-10-CM

## 2024-05-10 RX ORDER — METFORMIN HYDROCHLORIDE 500 MG/1
500 TABLET, EXTENDED RELEASE ORAL
Qty: 90 TABLET | Refills: 0 | Status: SHIPPED | OUTPATIENT
Start: 2024-05-10 | End: 2025-06-14

## 2024-05-10 NOTE — TELEPHONE ENCOUNTER
Patient calling to report AM glucose the past month has been between 170-180     States this has occurred since he received knee injections

## 2024-05-29 ENCOUNTER — PATIENT OUTREACH (OUTPATIENT)
Dept: CARE COORDINATION | Facility: CLINIC | Age: 89
End: 2024-05-29
Payer: MEDICARE

## 2024-05-29 NOTE — PROGRESS NOTES
Final call.  CM called and spoke with pt to address any final questions or concerns regarding hospitalization.  Pt reports doing well and has no concerns at this time. Pt report bg lower   127 this am.   Pt given my contact info  in the event questions should arise.

## 2024-06-07 ENCOUNTER — TELEPHONE (OUTPATIENT)
Dept: PRIMARY CARE | Facility: CLINIC | Age: 89
End: 2024-06-07

## 2024-06-07 DIAGNOSIS — J30.9 ALLERGIC RHINITIS, UNSPECIFIED SEASONALITY, UNSPECIFIED TRIGGER: Primary | ICD-10-CM

## 2024-06-07 RX ORDER — LEVOCETIRIZINE DIHYDROCHLORIDE 5 MG/1
5 TABLET, FILM COATED ORAL EVERY EVENING
Qty: 90 TABLET | Refills: 1 | Status: SHIPPED | OUTPATIENT
Start: 2024-06-07 | End: 2025-06-07

## 2024-06-07 NOTE — TELEPHONE ENCOUNTER
Patient is unable to come in this year for allergy shot due to things at home    He is asking if you can prescribe him allergy medication for this summer    [facility]

## 2024-06-07 NOTE — TELEPHONE ENCOUNTER
I will send a prescription for Xyzal, a once daily antihistamine.  (If he is taking Claritin, Zyrtec, or Allegra, this will replace it)    Continue Singulair (montelukast)

## 2024-07-01 ENCOUNTER — TELEPHONE (OUTPATIENT)
Dept: PRIMARY CARE | Facility: CLINIC | Age: 89
End: 2024-07-01
Payer: MEDICARE

## 2024-07-01 DIAGNOSIS — J30.9 ALLERGIC RHINITIS, UNSPECIFIED SEASONALITY, UNSPECIFIED TRIGGER: Primary | ICD-10-CM

## 2024-07-01 NOTE — TELEPHONE ENCOUNTER
Patient believes he is having bad allergies, has a bad runny nose    Unable to come into the office due to severe bed bug infestation     Is asking for steroids like he had prior

## 2024-07-01 NOTE — TELEPHONE ENCOUNTER
Please verify he is taking his antihistamine, Xyzal and his Singulair and a steroid nasal spray,  If not he needs to be taking all of these medications to control his allergies

## 2024-07-02 RX ORDER — AZELASTINE 1 MG/ML
2 SPRAY, METERED NASAL 2 TIMES DAILY
Qty: 30 ML | Refills: 12 | Status: SHIPPED | OUTPATIENT
Start: 2024-07-02 | End: 2025-07-02

## 2024-07-02 NOTE — TELEPHONE ENCOUNTER
I would recommend he stay on all the current medications and add a second nasal spray Astelin, 2 sprays twice a day to help reduce the nasal symptoms I will send this in to the pharmacy   normal... Well appearing, well nourished, awake, alert, oriented to person, place, time/situation and in no apparent distress.

## 2024-07-08 DIAGNOSIS — J30.0 VASOMOTOR RHINITIS: Primary | ICD-10-CM

## 2024-07-08 DIAGNOSIS — E11.40 TYPE 2 DIABETES MELLITUS WITH DIABETIC NEUROPATHY, WITHOUT LONG-TERM CURRENT USE OF INSULIN (MULTI): ICD-10-CM

## 2024-07-08 RX ORDER — METFORMIN HYDROCHLORIDE 500 MG/1
500 TABLET, EXTENDED RELEASE ORAL
Qty: 90 TABLET | Refills: 3 | Status: SHIPPED | OUTPATIENT
Start: 2024-07-08 | End: 2025-07-08

## 2024-07-09 NOTE — TELEPHONE ENCOUNTER
Patient called again regarding this and said that he cannot get the runny nose, congestion and mucus to stop. He is unware if this can be due to the bed bugs. He is asking for anything else to help.

## 2024-07-10 RX ORDER — IPRATROPIUM BROMIDE 21 UG/1
SPRAY, METERED NASAL
Qty: 30 ML | Refills: 11 | Status: SHIPPED | OUTPATIENT
Start: 2024-07-10

## 2024-07-10 NOTE — TELEPHONE ENCOUNTER
Patient states it is constantly running, like a faucet and like what happens when one goes outside in really cold weather. Is thin and watery.    Is willing to try nasal spray, please send in . Willing to try allergy med I.e zyrtec or claritin if that would help but would need Rx'd as well.

## 2024-07-10 NOTE — TELEPHONE ENCOUNTER
I sent a different nasal spray to [facility], should help more than the last spray.  Can use it before meals, if food triggers it consistently

## 2024-07-10 NOTE — TELEPHONE ENCOUNTER
"Astelin from [facility] didn't help apparently?  Is the runny nose thin, watery, triggered by eating?  \"Run like a faucet\"?  If so, there is yet another spray that helps this (ipatropium).  Can send if the description fits.... is similar to what happens when one goes outside in really cold weather  "

## 2024-07-18 ENCOUNTER — TELEPHONE (OUTPATIENT)
Dept: PRIMARY CARE | Facility: CLINIC | Age: 89
End: 2024-07-18
Payer: MEDICARE

## 2024-07-18 NOTE — TELEPHONE ENCOUNTER
For congestion :  Nasal saline spray,  each side,   4x a day for 5 days .   Claritin D , which is over the counter,  once a day for 5days. If not resolved, appt needed.

## 2024-07-18 NOTE — TELEPHONE ENCOUNTER
Pt called and stated Dr zamudio recommend a nose spray and it is not working it worked only the first day   Stated ears feel plugged also   Do you recommend an appointment     Asking for another recommendation for the congestion

## 2024-07-31 ENCOUNTER — APPOINTMENT (OUTPATIENT)
Dept: PRIMARY CARE | Facility: CLINIC | Age: 89
End: 2024-07-31
Payer: MEDICARE

## 2024-08-22 ENCOUNTER — TELEPHONE (OUTPATIENT)
Dept: PRIMARY CARE | Facility: CLINIC | Age: 89
End: 2024-08-22
Payer: MEDICARE

## 2024-08-22 DIAGNOSIS — E55.9 VITAMIN D DEFICIENCY: ICD-10-CM

## 2024-08-22 DIAGNOSIS — E53.8 VITAMIN B12 DEFICIENCY: ICD-10-CM

## 2024-08-22 DIAGNOSIS — E11.9 TYPE 2 DIABETES MELLITUS WITHOUT COMPLICATION, UNSPECIFIED WHETHER LONG TERM INSULIN USE (MULTI): Primary | Chronic | ICD-10-CM

## 2024-08-22 NOTE — TELEPHONE ENCOUNTER
Patient called asking for just general lab orders that he gets done every six months. He also said that his blood sugar has been between 180-200. Patient has been taking 2 metformin please give your advice on this.

## 2024-08-22 NOTE — TELEPHONE ENCOUNTER
His appt that was scheduled 7/31/24 with Dr. Sanchez was cancelled, this needs rescheduled ASAP with Dr. Sanchez (use acute slot)     I will place orders and he can discuss results with Dr. Sanchez when he comes in regarding his sugars

## 2024-08-25 DIAGNOSIS — E78.2 MIXED HYPERLIPIDEMIA: ICD-10-CM

## 2024-08-25 DIAGNOSIS — I10 BENIGN ESSENTIAL HYPERTENSION: ICD-10-CM

## 2024-08-25 DIAGNOSIS — E11.40 TYPE 2 DIABETES MELLITUS WITH DIABETIC NEUROPATHY, WITHOUT LONG-TERM CURRENT USE OF INSULIN (MULTI): Primary | ICD-10-CM

## 2024-08-26 ENCOUNTER — LAB (OUTPATIENT)
Dept: LAB | Facility: CLINIC | Age: 89
End: 2024-08-26
Payer: MEDICARE

## 2024-08-26 DIAGNOSIS — E11.40 TYPE 2 DIABETES MELLITUS WITH DIABETIC NEUROPATHY, WITHOUT LONG-TERM CURRENT USE OF INSULIN (MULTI): ICD-10-CM

## 2024-08-26 DIAGNOSIS — E53.8 VITAMIN B12 DEFICIENCY: ICD-10-CM

## 2024-08-26 DIAGNOSIS — E55.9 VITAMIN D DEFICIENCY: ICD-10-CM

## 2024-08-26 DIAGNOSIS — E78.2 MIXED HYPERLIPIDEMIA: ICD-10-CM

## 2024-08-26 DIAGNOSIS — I10 BENIGN ESSENTIAL HYPERTENSION: ICD-10-CM

## 2024-08-26 LAB
25(OH)D3 SERPL-MCNC: 58 NG/ML (ref 30–100)
ALBUMIN SERPL BCP-MCNC: 4 G/DL (ref 3.4–5)
ALP SERPL-CCNC: 68 U/L (ref 33–136)
ALT SERPL W P-5'-P-CCNC: 11 U/L (ref 10–52)
ANION GAP SERPL CALC-SCNC: 15 MMOL/L (ref 10–20)
AST SERPL W P-5'-P-CCNC: 14 U/L (ref 9–39)
BASOPHILS # BLD AUTO: 0.05 X10*3/UL (ref 0–0.1)
BASOPHILS NFR BLD AUTO: 0.8 %
BILIRUB SERPL-MCNC: 0.6 MG/DL (ref 0–1.2)
BUN SERPL-MCNC: 27 MG/DL (ref 6–23)
CALCIUM SERPL-MCNC: 9.3 MG/DL (ref 8.6–10.6)
CHLORIDE SERPL-SCNC: 102 MMOL/L (ref 98–107)
CHOLEST SERPL-MCNC: 155 MG/DL (ref 0–199)
CHOLESTEROL/HDL RATIO: 4.1
CO2 SERPL-SCNC: 27 MMOL/L (ref 21–32)
CREAT SERPL-MCNC: 1.05 MG/DL (ref 0.5–1.3)
EGFRCR SERPLBLD CKD-EPI 2021: 68 ML/MIN/1.73M*2
EOSINOPHIL # BLD AUTO: 0.28 X10*3/UL (ref 0–0.4)
EOSINOPHIL NFR BLD AUTO: 4.2 %
ERYTHROCYTE [DISTWIDTH] IN BLOOD BY AUTOMATED COUNT: 14.2 % (ref 11.5–14.5)
EST. AVERAGE GLUCOSE BLD GHB EST-MCNC: 131 MG/DL
GLUCOSE SERPL-MCNC: 101 MG/DL (ref 74–99)
HBA1C MFR BLD: 6.2 %
HCT VFR BLD AUTO: 36.7 % (ref 41–52)
HDLC SERPL-MCNC: 38.1 MG/DL
HGB BLD-MCNC: 12.1 G/DL (ref 13.5–17.5)
IMM GRANULOCYTES # BLD AUTO: 0.02 X10*3/UL (ref 0–0.5)
IMM GRANULOCYTES NFR BLD AUTO: 0.3 % (ref 0–0.9)
LDLC SERPL CALC-MCNC: 91 MG/DL
LYMPHOCYTES # BLD AUTO: 1.12 X10*3/UL (ref 0.8–3)
LYMPHOCYTES NFR BLD AUTO: 16.8 %
MCH RBC QN AUTO: 29.4 PG (ref 26–34)
MCHC RBC AUTO-ENTMCNC: 33 G/DL (ref 32–36)
MCV RBC AUTO: 89 FL (ref 80–100)
MONOCYTES # BLD AUTO: 0.71 X10*3/UL (ref 0.05–0.8)
MONOCYTES NFR BLD AUTO: 10.7 %
NEUTROPHILS # BLD AUTO: 4.48 X10*3/UL (ref 1.6–5.5)
NEUTROPHILS NFR BLD AUTO: 67.2 %
NON HDL CHOLESTEROL: 117 MG/DL (ref 0–149)
NRBC BLD-RTO: 0 /100 WBCS (ref 0–0)
PLATELET # BLD AUTO: 233 X10*3/UL (ref 150–450)
POTASSIUM SERPL-SCNC: 4.8 MMOL/L (ref 3.5–5.3)
PROT SERPL-MCNC: 6.9 G/DL (ref 6.4–8.2)
RBC # BLD AUTO: 4.12 X10*6/UL (ref 4.5–5.9)
SODIUM SERPL-SCNC: 139 MMOL/L (ref 136–145)
TRIGL SERPL-MCNC: 130 MG/DL (ref 0–149)
TSH SERPL-ACNC: 3.07 MIU/L (ref 0.44–3.98)
VIT B12 SERPL-MCNC: 400 PG/ML (ref 211–911)
VLDL: 26 MG/DL (ref 0–40)
WBC # BLD AUTO: 6.7 X10*3/UL (ref 4.4–11.3)

## 2024-08-26 PROCEDURE — 85025 COMPLETE CBC W/AUTO DIFF WBC: CPT

## 2024-08-26 PROCEDURE — 83036 HEMOGLOBIN GLYCOSYLATED A1C: CPT

## 2024-08-26 PROCEDURE — 36415 COLL VENOUS BLD VENIPUNCTURE: CPT

## 2024-08-26 PROCEDURE — 82607 VITAMIN B-12: CPT

## 2024-08-26 PROCEDURE — 80061 LIPID PANEL: CPT

## 2024-08-26 PROCEDURE — 84520 ASSAY OF UREA NITROGEN: CPT

## 2024-08-26 PROCEDURE — 84443 ASSAY THYROID STIM HORMONE: CPT

## 2024-08-26 PROCEDURE — 82306 VITAMIN D 25 HYDROXY: CPT

## 2024-08-27 ENCOUNTER — APPOINTMENT (OUTPATIENT)
Dept: LAB | Facility: LAB | Age: 89
End: 2024-08-27
Payer: MEDICARE

## 2024-08-27 ENCOUNTER — APPOINTMENT (OUTPATIENT)
Dept: PRIMARY CARE | Facility: CLINIC | Age: 89
End: 2024-08-27
Payer: MEDICARE

## 2024-08-27 VITALS
TEMPERATURE: 98.9 F | OXYGEN SATURATION: 97 % | SYSTOLIC BLOOD PRESSURE: 136 MMHG | DIASTOLIC BLOOD PRESSURE: 72 MMHG | WEIGHT: 173.6 LBS | HEART RATE: 59 BPM | BODY MASS INDEX: 28.89 KG/M2

## 2024-08-27 DIAGNOSIS — M17.0 PRIMARY OSTEOARTHRITIS OF BOTH KNEES: Primary | ICD-10-CM

## 2024-08-27 DIAGNOSIS — I10 BENIGN ESSENTIAL HYPERTENSION: ICD-10-CM

## 2024-08-27 DIAGNOSIS — I42.9 CARDIOMYOPATHY, UNSPECIFIED TYPE (MULTI): ICD-10-CM

## 2024-08-27 DIAGNOSIS — E11.40 TYPE 2 DIABETES MELLITUS WITH DIABETIC NEUROPATHY, WITHOUT LONG-TERM CURRENT USE OF INSULIN (MULTI): ICD-10-CM

## 2024-08-27 DIAGNOSIS — J30.9 ALLERGIC RHINITIS, UNSPECIFIED SEASONALITY, UNSPECIFIED TRIGGER: ICD-10-CM

## 2024-08-27 DIAGNOSIS — M25.50 ARTHRALGIA, UNSPECIFIED JOINT: ICD-10-CM

## 2024-08-27 PROCEDURE — 96372 THER/PROPH/DIAG INJ SC/IM: CPT | Performed by: FAMILY MEDICINE

## 2024-08-27 PROCEDURE — 82570 ASSAY OF URINE CREATININE: CPT

## 2024-08-27 PROCEDURE — 99214 OFFICE O/P EST MOD 30 MIN: CPT | Performed by: FAMILY MEDICINE

## 2024-08-27 PROCEDURE — 80365 DRUG SCREENING OXYCODONE: CPT

## 2024-08-27 PROCEDURE — 80346 BENZODIAZEPINES1-12: CPT

## 2024-08-27 PROCEDURE — 80361 OPIATES 1 OR MORE: CPT

## 2024-08-27 PROCEDURE — 1159F MED LIST DOCD IN RCRD: CPT | Performed by: FAMILY MEDICINE

## 2024-08-27 PROCEDURE — 80368 SEDATIVE HYPNOTICS: CPT

## 2024-08-27 PROCEDURE — 3078F DIAST BP <80 MM HG: CPT | Performed by: FAMILY MEDICINE

## 2024-08-27 PROCEDURE — 80373 DRUG SCREENING TRAMADOL: CPT

## 2024-08-27 PROCEDURE — 80354 DRUG SCREENING FENTANYL: CPT

## 2024-08-27 PROCEDURE — 80307 DRUG TEST PRSMV CHEM ANLYZR: CPT

## 2024-08-27 PROCEDURE — 3075F SYST BP GE 130 - 139MM HG: CPT | Performed by: FAMILY MEDICINE

## 2024-08-27 PROCEDURE — 80358 DRUG SCREENING METHADONE: CPT

## 2024-08-27 RX ORDER — HYDROCODONE BITARTRATE AND ACETAMINOPHEN 5; 325 MG/1; MG/1
1 TABLET ORAL EVERY EVENING
Qty: 45 TABLET | Refills: 0 | Status: SHIPPED | OUTPATIENT
Start: 2024-08-27

## 2024-08-27 RX ORDER — TRIAMCINOLONE ACETONIDE 40 MG/ML
80 INJECTION, SUSPENSION INTRA-ARTICULAR; INTRAMUSCULAR ONCE
Status: COMPLETED | OUTPATIENT
Start: 2024-08-27 | End: 2024-08-27

## 2024-08-27 NOTE — PROGRESS NOTES
Subjective   Patient ID: Agustin Panda is a 89 y.o. male who presents for Follow-up (Farhad is here to follow up on DM. Pt states he is taking his Metformin and it has helped his sugar levels. He is suppose to take one 500 mg every day. Farhad states he  is taking 2 500 mg every day. ).    HPI   Farhad was seen today for follow-up and review of his medications, including those for diabetes, hypertension, hyperlipidemia.  Medication(s) are being taken and tolerated as prescribed, without concerns, list reconciled today.  There are no complaints of chest pain, shortness of breath, lower extremity edema, or exertional concerns  His biggest ongoing concern today is that of severe bilateral knee pain, as late stage of osteoarthritis, needs bilateral knee replacements which he will not do, given age and comorbidities.  Injections no longer help, nor do over-the-counter medications.  He notes fairly clear pain at night, has difficulty sleeping because of it.  He also notes pain during the day, though his activity is significantly limited based on his symptoms.  He has never taken prescription pain medication for his knees.  Farhad would greatly benefit from assistive devices as well as a chair with seat lift.    Review of Systems  The full review of systems is negative with the exception of what is noted in HPI    Objective   /72 (BP Location: Right arm, Patient Position: Sitting, BP Cuff Size: Adult)   Pulse 59   Temp 37.2 °C (98.9 °F) (Temporal)   Wt 78.7 kg (173 lb 9.6 oz)   SpO2 97%   BMI 28.89 kg/m²     Physical Exam  Constitutional/General appearance: alert, oriented, well-appearing, in no distress  Head and face exam is normal  No scleral icterus or conjunctival erythema present  Hearing is grossly normal  Respiratory effort is normal, no dyspnea noted  Cortical function is normal  Mood, affect, are pleasant, appropriate, and interactive.  Insight is normal  Cardiac exam reveals a regular rate and rhythm,  trace  murmur present.   Lungs are clear bilaterally.    No lower extremity edema present.    Assessment/Plan     Hypertension--- since today's blood pressures are at goal, I have recommended continuing the current treatment regimen, including medication as noted above, as well as a low salt, low-fat, high-fiber diet.  Exercise is to be continued as able and tolerated.     Type II diabetes mellitus--- since the HBA1C's are/have remained stable, the current plan will be continued, including prescription medication (refilled as noted), a low carbohydrate diet, as well as regular exercise as tolerated.  I have recommended the next followup be in 6 months.  The importance of an annual dilated diabetic eye exam, annual urine for microalbumin, an annual foot exam, as well as signs and symptoms of hypoglycemia and peripheral neuropathy were stressed.  Routine self examination of the feet will be continued.  Labs will be assessed on at least a twice yearly basis.    Continue all other medications as listed.  Check labs today    Chronic, severe, functionally limiting bilateral knee pain from osteoarthritis, no other treatment options other than to treat medically.  I have agreed to prescribe 1 Vicodin at night, and 1 every morning only as needed.  Controlled substance agreement and urine drug screen done today.  I have personally reviewed the OARRS report for this patient. I have considered the risks of abuse, dependence, addiction and diversion  We will plan on a follow-up in 3 months    **Portions of this medical record have been created using voice recognition software and may have minor errors which are inherent in voice recognition systems. It has not been fully edited for typographical or grammatical errors**

## 2024-08-28 LAB
AMPHETAMINES UR QL SCN: NORMAL
BARBITURATES UR QL SCN: NORMAL
BZE UR QL SCN: NORMAL
CANNABINOIDS UR QL SCN: NORMAL
CREAT UR-MCNC: 46.1 MG/DL (ref 20–370)
PCP UR QL SCN: NORMAL

## 2024-09-03 ENCOUNTER — TELEPHONE (OUTPATIENT)
Dept: PRIMARY CARE | Facility: CLINIC | Age: 89
End: 2024-09-03

## 2024-09-03 NOTE — TELEPHONE ENCOUNTER
Patient wanted you to be aware he was recently [place] and put on antibiotics for 7 days

## 2024-09-04 ENCOUNTER — TELEPHONE (OUTPATIENT)
Dept: PRIMARY CARE | Facility: CLINIC | Age: 89
End: 2024-09-04
Payer: MEDICARE

## 2024-09-04 NOTE — TELEPHONE ENCOUNTER
Farhad called and ask if you would send him in a RX for the inhaler that you had him on before. He can not remember the name of it. I do not see one on his med list.     Please advise.

## 2024-09-04 NOTE — TELEPHONE ENCOUNTER
Patient will only see you, is there anywhere we can put this patient on your schedule for a hospital discharge appointment in the next 2 weeks?

## 2024-09-04 NOTE — TELEPHONE ENCOUNTER
Patient called, said he needs to set up a hospital discharge appointment but needs to figure out when someone can bring him before he books it. He also wanted to make you aware that his fasting sugar every morning has been around to 200.

## 2024-09-05 DIAGNOSIS — E11.40 TYPE 2 DIABETES MELLITUS WITH DIABETIC NEUROPATHY, WITHOUT LONG-TERM CURRENT USE OF INSULIN (MULTI): ICD-10-CM

## 2024-09-05 DIAGNOSIS — R06.09 DYSPNEA ON EXERTION: Primary | ICD-10-CM

## 2024-09-05 NOTE — TELEPHONE ENCOUNTER
Patient is aware, will take 3 instead.     No hospital follow up available for 3 weeks.  Patient states he does not have to come in to see you, he will see if he feels better and let us know.    Does complain of burning with urination still, informed they did do UA at hospital but to call back tomorrow if it does not improve

## 2024-09-05 NOTE — TELEPHONE ENCOUNTER
Per patient, it is this one:      albuterol 90 mcg/actuation inhaler [[id number]]  DISCONTINUED    Order Details  Dose: 2 puff Route: inhalation Frequency: Every 6 hours PRN for wheezing   Dispense Quantity: 18 g Refills: 2          Sig: Inhale 2 puffs every 6 hours if needed for wheezing.

## 2024-09-05 NOTE — TELEPHONE ENCOUNTER
Have him go back to 4 metformin daily for now.  Can use any 11am or 330 pm that is currently available for an add-on hospital follow-up

## 2024-09-05 NOTE — TELEPHONE ENCOUNTER
"Patient calling again, explained that this has not been addressed yet and does take time    He is worried because his glucose in the morning has been over 200, states he is taking one \"mcformin\" a day instead of two and does not think it is working   "

## 2024-09-06 RX ORDER — METFORMIN HYDROCHLORIDE 500 MG/1
1500 TABLET, EXTENDED RELEASE ORAL DAILY
Qty: 90 TABLET | Refills: 3 | Status: SHIPPED | OUTPATIENT
Start: 2024-09-06 | End: 2025-09-06

## 2024-09-06 RX ORDER — ALBUTEROL SULFATE 90 UG/1
2 INHALANT RESPIRATORY (INHALATION) EVERY 6 HOURS PRN
Qty: 18 G | Refills: 0 | Status: SHIPPED | OUTPATIENT
Start: 2024-09-06 | End: 2025-09-06

## 2024-09-06 NOTE — TELEPHONE ENCOUNTER
Have him finish his antibiotic, there was only a small amount of bacteria in the urine be sure to drink 64 ounces of fluid daily.  If not improving into next week, consider rechecking urine sample.

## 2024-09-06 NOTE — TELEPHONE ENCOUNTER
In note:    (ABNORMAL) URINE CULTURE IF INDICATED (09/01/2024 11:58 AM EDT)  Lab Results - (ABNORMAL) URINE CULTURE IF INDICATED (09/01/2024 11:58 AM EDT)  Component Value Ref Range Test Method Analysis Time Performed At Pathologist [facility]   Culture, Urine 50,000-<100,000 CFU/ml Escherichia coli (A)   MINIMUM INHIBITORY CONCENTRATION(VITEK)  09/03/2024 1:28 PM EDT [facility]

## 2024-09-06 NOTE — TELEPHONE ENCOUNTER
Patient is aware    Also advised him his metformin prescription is to be taken in the evening with a meal (dinner)     States his glucose today was 144 after increasing to 3 pills yesterday

## 2024-09-19 DIAGNOSIS — M17.0 PRIMARY OSTEOARTHRITIS OF BOTH KNEES: ICD-10-CM

## 2024-09-19 DIAGNOSIS — E11.40 TYPE 2 DIABETES MELLITUS WITH DIABETIC NEUROPATHY, WITHOUT LONG-TERM CURRENT USE OF INSULIN: ICD-10-CM

## 2024-09-20 RX ORDER — HYDROCODONE BITARTRATE AND ACETAMINOPHEN 5; 325 MG/1; MG/1
1 TABLET ORAL EVERY EVENING
Qty: 45 TABLET | Refills: 0 | Status: SHIPPED | OUTPATIENT
Start: 2024-09-20

## 2024-09-20 RX ORDER — METFORMIN HYDROCHLORIDE 500 MG/1
1500 TABLET, EXTENDED RELEASE ORAL DAILY
Qty: 90 TABLET | Refills: 3 | Status: SHIPPED | OUTPATIENT
Start: 2024-09-20 | End: 2025-09-20

## 2024-09-21 NOTE — TELEPHONE ENCOUNTER
Called patient    stop the xyzal before bed     Continue flomax     Continue pain med       Will have to reconsider tx if urinary retention happens again

## 2024-10-01 ENCOUNTER — CLINICAL SUPPORT (OUTPATIENT)
Dept: PRIMARY CARE | Facility: CLINIC | Age: 89
End: 2024-10-01
Payer: MEDICARE

## 2024-10-01 DIAGNOSIS — Z23 IMMUNIZATION DUE: ICD-10-CM

## 2024-10-01 PROCEDURE — 90662 IIV NO PRSV INCREASED AG IM: CPT | Performed by: FAMILY MEDICINE

## 2024-10-01 PROCEDURE — G0008 ADMIN INFLUENZA VIRUS VAC: HCPCS | Performed by: FAMILY MEDICINE

## 2024-10-08 DIAGNOSIS — I10 BENIGN ESSENTIAL HYPERTENSION: ICD-10-CM

## 2024-10-08 RX ORDER — LISINOPRIL 10 MG/1
10 TABLET ORAL DAILY
Qty: 90 TABLET | Refills: 1 | Status: SHIPPED | OUTPATIENT
Start: 2024-10-08

## 2024-10-11 DIAGNOSIS — M17.0 PRIMARY OSTEOARTHRITIS OF BOTH KNEES: ICD-10-CM

## 2024-10-12 RX ORDER — HYDROCODONE BITARTRATE AND ACETAMINOPHEN 5; 325 MG/1; MG/1
1 TABLET ORAL 2 TIMES DAILY
Qty: 45 TABLET | Refills: 0 | Status: SHIPPED | OUTPATIENT
Start: 2024-10-12

## 2024-10-16 ENCOUNTER — TELEPHONE (OUTPATIENT)
Dept: PRIMARY CARE | Facility: CLINIC | Age: 89
End: 2024-10-16
Payer: MEDICARE

## 2024-10-16 DIAGNOSIS — M17.0 PRIMARY OSTEOARTHRITIS OF BOTH KNEES: ICD-10-CM

## 2024-10-16 RX ORDER — HYDROCODONE BITARTRATE AND ACETAMINOPHEN 5; 325 MG/1; MG/1
1 TABLET ORAL 3 TIMES DAILY
Qty: 90 TABLET | Refills: 0 | Status: SHIPPED | OUTPATIENT
Start: 2024-10-16

## 2024-10-16 NOTE — TELEPHONE ENCOUNTER
Pt called about his Norco. Pt has not picked up his script that sent on 10/12/24. Pt is prescribed to take it twice a day. Pt wants to know if he can take it three times a day. Please advise.

## 2024-10-22 ENCOUNTER — TELEPHONE (OUTPATIENT)
Dept: CARDIOLOGY | Facility: CLINIC | Age: 89
End: 2024-10-22
Payer: MEDICARE

## 2024-10-22 DIAGNOSIS — I49.9 IRREGULAR HEART BEAT: ICD-10-CM

## 2024-10-22 NOTE — TELEPHONE ENCOUNTER
Rec'd call from [facility] NP stating she was out to see patient today and feels he is having an irregular heartbeat. She is asking if patient has h/o afib?  Discussed patient does not have h/o Afib, does have frequent PVC's, short runs of nonsustained VT. NP stated she really didn't think it was PVC's. Patient is not c/o symptoms.  Reviewed with Dr. Carroll- patient to come in for EKG to r/o afib.  Order placed.    LM for patient re: above and requested a call back to schedule EKG at [facility].

## 2024-10-22 NOTE — TELEPHONE ENCOUNTER
Scheduled on 10/25 at 9:15am per patient request. Unable to come sooner d/t he has physical therapy appts on Tue, Wed, Thurs.

## 2024-10-25 ENCOUNTER — HOSPITAL ENCOUNTER (OUTPATIENT)
Dept: CARDIOLOGY | Facility: CLINIC | Age: 89
Discharge: HOME | End: 2024-10-25
Payer: MEDICARE

## 2024-10-25 PROCEDURE — 93005 ELECTROCARDIOGRAM TRACING: CPT

## 2024-10-26 LAB
ATRIAL RATE: 64 BPM
Q ONSET: 222 MS
QRS COUNT: 11 BEATS
QRS DURATION: 88 MS
QT INTERVAL: 402 MS
QTC CALCULATION(BAZETT): 414 MS
QTC FREDERICIA: 410 MS
R AXIS: -8 DEGREES
T AXIS: -5 DEGREES
T OFFSET: 423 MS
VENTRICULAR RATE: 64 BPM

## 2024-11-05 DIAGNOSIS — M17.0 PRIMARY OSTEOARTHRITIS OF BOTH KNEES: ICD-10-CM

## 2024-11-05 RX ORDER — HYDROCODONE BITARTRATE AND ACETAMINOPHEN 5; 325 MG/1; MG/1
1 TABLET ORAL 3 TIMES DAILY
Qty: 90 TABLET | Refills: 0 | Status: SHIPPED | OUTPATIENT
Start: 2024-11-05

## 2024-12-10 ENCOUNTER — TELEPHONE (OUTPATIENT)
Dept: PRIMARY CARE | Facility: CLINIC | Age: 89
End: 2024-12-10
Payer: MEDICARE

## 2024-12-10 DIAGNOSIS — M17.0 PRIMARY OSTEOARTHRITIS OF BOTH KNEES: Primary | ICD-10-CM

## 2024-12-10 DIAGNOSIS — M17.0 PRIMARY OSTEOARTHRITIS OF BOTH KNEES: ICD-10-CM

## 2024-12-10 RX ORDER — HYDROCODONE BITARTRATE AND ACETAMINOPHEN 5; 325 MG/1; MG/1
1 TABLET ORAL EVERY 8 HOURS PRN
Qty: 90 TABLET | Refills: 0 | Status: SHIPPED | OUTPATIENT
Start: 2024-12-10

## 2024-12-10 NOTE — TELEPHONE ENCOUNTER
Patient would like an order put in for a chair lift.  [facility] is asking for the order and last note in order to authorize this.  Also, this patient said he will miss you terribly!

## 2024-12-11 NOTE — TELEPHONE ENCOUNTER
I printed Rx for chair with seat lift.  Will need sent wherever it needs to go with my note from Aug documenting need.

## 2024-12-20 ENCOUNTER — TELEPHONE (OUTPATIENT)
Dept: PRIMARY CARE | Facility: CLINIC | Age: 89
End: 2024-12-20
Payer: MEDICARE

## 2024-12-20 NOTE — TELEPHONE ENCOUNTER
Pt called requesting a psa be added on to his bw that needs to be done in March. He just found out his younger brother was diagnosed with prostate cancer. Pt does not need to be called once order is placed.

## 2024-12-22 NOTE — TELEPHONE ENCOUNTER
PSA was excellent in 2023.    Urology guidelines do not recommend further PSA checks, given age, medical problems, but will order if he really wants.

## 2024-12-23 ENCOUNTER — TELEPHONE (OUTPATIENT)
Dept: PRIMARY CARE | Facility: CLINIC | Age: 89
End: 2024-12-23
Payer: MEDICARE

## 2024-12-23 DIAGNOSIS — Z12.5 SCREENING PSA (PROSTATE SPECIFIC ANTIGEN): Primary | ICD-10-CM

## 2024-12-23 NOTE — TELEPHONE ENCOUNTER
Patient called and said that there is some confusion.  He said he needs a motorized chair lift for the stairs so he can get down to his basement.  Please advise.

## 2024-12-30 ENCOUNTER — PATIENT OUTREACH (OUTPATIENT)
Dept: PRIMARY CARE | Facility: CLINIC | Age: 89
End: 2024-12-30
Payer: MEDICARE

## 2024-12-30 RX ORDER — DOXYCYCLINE 100 MG/1
100 CAPSULE ORAL 2 TIMES DAILY
COMMUNITY

## 2024-12-30 RX ORDER — CEFDINIR 300 MG/1
300 CAPSULE ORAL 2 TIMES DAILY
COMMUNITY

## 2024-12-30 NOTE — PROGRESS NOTES
Discharge Facility:[facility]   Discharge Diagnosis:  Pneumonia of left lower lobe due to infectious organism   Admission Date:12/26/2024  Discharge Date: 12/29/2024    PCP Appointment Date:1/8/2025  Specialist Appointment Date: N/A  Hospital Encounter and Summary Linked: Yes  See discharge assessment below for further details  Engagement  Call Start Time: 1403 (12/30/2024  5:31 PM)    Medications  Medications reviewed with patient/caregiver?: Yes (12/30/2024  5:31 PM)  Is the patient having any side effects they believe may be caused by any medication additions or changes?: No (12/30/2024  5:31 PM)  Does the patient have all medications ordered at discharge?: Yes (12/30/2024  5:31 PM)  Care Management Interventions: No intervention needed (12/30/2024  5:31 PM)  Prescription Comments: -- (Omnicef 300 mg one bid x 5 days, Doxycycline 100 mg bid x 5 days) (12/30/2024  5:31 PM)  Is the patient taking all medications as directed (includes completed medication regime)?: Yes (12/30/2024  5:31 PM)  Care Management Interventions: Provided patient education (Discussed Mucinex, was getting inpatient and wanted to continue, we talked about can get generic most pharmacies, he will check.) (12/30/2024  5:31 PM)  Medication Comments: -- (Farhad verbalized understanding of medications) (12/30/2024  5:31 PM)    Appointments  Does the patient have a primary care provider?: Yes (12/30/2024  5:31 PM)  Care Management Interventions: Verified appointment date/time/provider (1/8/2025) (12/30/2024  5:31 PM)  Has the patient kept scheduled appointments due by today?: Yes (12/30/2024  5:31 PM)    Self Management  What is the home health agency?: -- (N/A) (12/30/2024  5:31 PM)  What Durable Medical Equipment (DME) was ordered?: -- (O2 2-4 Lcontinuous, Rollator Walker) (12/30/2024  5:31 PM)  Has all Durable Medical Equipment (DME) been delivered?: Yes (12/30/2024  5:31 PM)    Patient Teaching  Does the patient have access to their discharge  instructions?: Yes (12/30/2024  5:31 PM)  What is the patient's perception of their health status since discharge?: Improving (12/30/2024  5:31 PM)  Is the patient/caregiver able to teach back the hierarchy of who to call/visit for symptoms/problems? PCP, Specialist, Home Health nurse, Urgent Care, ED, 911: Yes (12/30/2024  5:31 PM)    Wrap Up  Wrap Up Additional Comments: -- (Farhad is doing better, he is wearing O2 and is monitoring PO2. He will contact provider if any concerns.) (12/30/2024  5:31 PM)

## 2025-01-02 DIAGNOSIS — K21.9 GASTROESOPHAGEAL REFLUX DISEASE, UNSPECIFIED WHETHER ESOPHAGITIS PRESENT: ICD-10-CM

## 2025-01-02 RX ORDER — OMEPRAZOLE 40 MG/1
40 CAPSULE, DELAYED RELEASE ORAL DAILY
Qty: 90 CAPSULE | Refills: 1 | Status: SHIPPED | OUTPATIENT
Start: 2025-01-02

## 2025-01-08 ENCOUNTER — APPOINTMENT (OUTPATIENT)
Dept: PRIMARY CARE | Facility: CLINIC | Age: 89
End: 2025-01-08
Payer: MEDICARE

## 2025-01-08 VITALS
WEIGHT: 173.9 LBS | SYSTOLIC BLOOD PRESSURE: 166 MMHG | HEART RATE: 59 BPM | BODY MASS INDEX: 28.94 KG/M2 | OXYGEN SATURATION: 98 % | DIASTOLIC BLOOD PRESSURE: 77 MMHG | TEMPERATURE: 98.5 F | RESPIRATION RATE: 14 BRPM

## 2025-01-08 DIAGNOSIS — K66.0 ABDOMINAL ADHESIONS DUE TO IMPLANTED MESH: ICD-10-CM

## 2025-01-08 DIAGNOSIS — J18.9 PNEUMONIA OF LEFT LOWER LOBE DUE TO INFECTIOUS ORGANISM: ICD-10-CM

## 2025-01-08 DIAGNOSIS — J96.01 ACUTE HYPOXEMIC RESPIRATORY FAILURE (MULTI): ICD-10-CM

## 2025-01-08 DIAGNOSIS — J30.9 ALLERGIC RHINITIS, UNSPECIFIED SEASONALITY, UNSPECIFIED TRIGGER: ICD-10-CM

## 2025-01-08 DIAGNOSIS — J44.1 COPD EXACERBATION (MULTI): ICD-10-CM

## 2025-01-08 DIAGNOSIS — T85.898A ABDOMINAL ADHESIONS DUE TO IMPLANTED MESH: ICD-10-CM

## 2025-01-08 DIAGNOSIS — Z12.5 ENCOUNTER FOR SCREENING FOR MALIGNANT NEOPLASM OF PROSTATE: ICD-10-CM

## 2025-01-08 DIAGNOSIS — R06.09 DYSPNEA ON EXERTION: ICD-10-CM

## 2025-01-08 DIAGNOSIS — J30.0 VASOMOTOR RHINITIS: ICD-10-CM

## 2025-01-08 DIAGNOSIS — Z09 HOSPITAL DISCHARGE FOLLOW-UP: Primary | ICD-10-CM

## 2025-01-08 DIAGNOSIS — Z80.42 FAMILY HISTORY OF PROSTATE CANCER: ICD-10-CM

## 2025-01-08 PROBLEM — M17.0 PRIMARY OSTEOARTHRITIS OF BOTH KNEES: Status: ACTIVE | Noted: 2024-04-17

## 2025-01-08 PROBLEM — Z99.81 REQUIRES CONTINUOUS AT HOME SUPPLEMENTAL OXYGEN: Status: ACTIVE | Noted: 2024-12-29

## 2025-01-08 PROCEDURE — 99495 TRANSJ CARE MGMT MOD F2F 14D: CPT | Performed by: STUDENT IN AN ORGANIZED HEALTH CARE EDUCATION/TRAINING PROGRAM

## 2025-01-08 PROCEDURE — 1160F RVW MEDS BY RX/DR IN RCRD: CPT | Performed by: STUDENT IN AN ORGANIZED HEALTH CARE EDUCATION/TRAINING PROGRAM

## 2025-01-08 PROCEDURE — 3077F SYST BP >= 140 MM HG: CPT | Performed by: STUDENT IN AN ORGANIZED HEALTH CARE EDUCATION/TRAINING PROGRAM

## 2025-01-08 PROCEDURE — 1159F MED LIST DOCD IN RCRD: CPT | Performed by: STUDENT IN AN ORGANIZED HEALTH CARE EDUCATION/TRAINING PROGRAM

## 2025-01-08 PROCEDURE — 3078F DIAST BP <80 MM HG: CPT | Performed by: STUDENT IN AN ORGANIZED HEALTH CARE EDUCATION/TRAINING PROGRAM

## 2025-01-08 RX ORDER — IPRATROPIUM BROMIDE 21 UG/1
2 SPRAY, METERED NASAL 3 TIMES DAILY
Qty: 30 ML | Refills: 11 | Status: SHIPPED | OUTPATIENT
Start: 2025-01-08

## 2025-01-08 RX ORDER — PREDNISONE 20 MG/1
40 TABLET ORAL DAILY
Qty: 10 TABLET | Refills: 0 | Status: SHIPPED | OUTPATIENT
Start: 2025-01-08 | End: 2025-01-13

## 2025-01-08 RX ORDER — ALBUTEROL SULFATE 90 UG/1
2 INHALANT RESPIRATORY (INHALATION) EVERY 4 HOURS PRN
Qty: 18 G | Refills: 11 | Status: SHIPPED | OUTPATIENT
Start: 2025-01-08

## 2025-01-08 RX ORDER — ALBUTEROL SULFATE AND BUDESONIDE 90; 80 UG/1; UG/1
AEROSOL, METERED RESPIRATORY (INHALATION)
COMMUNITY
Start: 2024-11-22

## 2025-01-08 RX ORDER — AZELASTINE 1 MG/ML
2 SPRAY, METERED NASAL 2 TIMES DAILY
Qty: 30 ML | Refills: 11 | Status: SHIPPED | OUTPATIENT
Start: 2025-01-08

## 2025-01-08 NOTE — TELEPHONE ENCOUNTER
Called patient to find out.  Patient said he has no idea and that we can send it to any company that will help him.  I did some research in case you are not familiar of any locations in this proximity  Here is a list of a few:  - [employer]  - [employer]  - [employer]

## 2025-01-08 NOTE — PATIENT INSTRUCTIONS
- Today and tomorrow, use your albuterol inhaler scheduled every 4 hours, then Friday you can use as needed every 4 hours.   - Call Pulmonology (lung doctor) office to see if you can move up your appointment.

## 2025-01-08 NOTE — PROGRESS NOTES
FAMILY MEDICINE  OFFICE VISIT   Agustin Panda  [id number]  1935    PCP: Nishi Adkins DO     Chief Complaint:   Chief Complaint   Patient presents with    Hospital Follow-up     12/26/2024-Pneumonia     SUBJECTIVE     Agustin Panda is a 89 y.o. English-speaking male, who presents to the clinic with complaints of hospital follow-up.    Hospital Follow-Up  - Admitted to [facility] 12/26-12/29   - Community acq PNA LEFT lower lobe    - COPD Exacerbation   - HTN   - Discharged home on O2, Cefdinir + Doxy x 5 days.   - Dr. García with [facility]  - On 4L today.   - Had chest physio therapy that helped   - Still having SOB with exertion  - Confirmed his FULL CODE status today.     Lung Disease  - Dug holes in the ground, manual labor  - Retired at 76  - Served in [place]        The following portions of the patient's chart were reviewed in this encounter and updated as appropriate:  Tobacco  Allergies  Meds  Problems  Med Hx  Surg Hx  Fam Hx         Home Medication List:  Current Outpatient Medications   Medication Instructions    albuterol (ProAir HFA) 90 mcg/actuation inhaler 2 puffs, inhalation, Every 6 hours PRN    albuterol-budesonide (Airsupra) 90-80 mcg/actuation inhaler Inhale.    azelastine (Astelin) 137 mcg (0.1 %) nasal spray 2 sprays, Each Nostril, 2 times daily, Use in each nostril as directed    blood sugar diagnostic (OneTouch Ultra Test) strip Test 1-2 times daily    blood-glucose meter (OneTouch Ultra2 Meter) misc Use as directed for sugar checks, once daily.    cefdinir (OMNICEF) 300 mg, 2 times daily    cholecalciferol (Vitamin D-3) 125 MCG (5000 UT) capsule TAKE 1 CAPSULE (5,000 UNITS) BY MOUTH ONCE EVERY DAY.    cyanocobalamin, vitamin B-12, (Vitamin B-12) 1,000 mcg tablet extended release 1 tablet, oral, Every Day    docusate sodium (COLACE) 100 mg, Every 12 hours PRN    doxycycline (VIBRAMYCIN) 100 mg, 2 times daily    DULoxetine (CYMBALTA) 30 mg, oral, Daily, For nerve pain     finasteride (PROSCAR) 5 mg, oral, Every Day    furosemide (LASIX) 20 mg, oral, Daily    guaiFENesin (MUCINEX) 600 mg, 2 times daily    HYDROcodone-acetaminophen (Norco) 5-325 mg tablet 1 tablet, oral, Every 8 hours PRN, from knee arthritis    ipratropium (Atrovent) 21 mcg (0.03 %) nasal spray Use 2 sprays in each nostril 3 times daily as needed    levocetirizine (XYZAL) 5 mg, oral, Every evening, For allergies    lisinopril 10 mg, oral, Daily    lovastatin (MEVACOR) 40 mg, oral, Every Day    metFORMIN XR (GLUCOPHAGE-XR) 1,500 mg, oral, Daily, With food    metoprolol succinate XL (TOPROL-XL) 50 mg, oral, Daily    montelukast (SINGULAIR) 10 mg, oral, Every evening    omeprazole (PRILOSEC) 40 mg, oral, Daily    OneTouch UltraSoft Lancets misc TEST DAILY AS DIRECTED    oxygen (O2) gas therapy 1 each, Continuous    polyethylene glycol (GLYCOLAX, MIRALAX) 17 g, Daily RT    tamsulosin (FLOMAX) 0.4 mg, oral, Nightly    Trelegy Ellipta 200-62.5-25 mcg blister with device inhale 1 puff by mouth daily (never exceed this dose). rinse mouth after using it         OBJECTIVE   /77 (BP Location: Right arm, Patient Position: Sitting, BP Cuff Size: Adult)   Pulse 59   Temp 36.9 °C (98.5 °F) (Temporal)   Resp 14   Wt 78.9 kg (173 lb 14.4 oz)   SpO2 98%   PF (!) 4 L/min   BMI 28.94 kg/m²   Vital signs and pulse oximetry reviewed.     Physical Exam  Pulmonary:      Breath sounds: Examination of the left-middle field reveals wheezing. Examination of the left-lower field reveals wheezing. Wheezing present. No rhonchi or rales.      Comments: 4L NC O2          ASSESSMENT & PLAN     Problem List Items Addressed This Visit       Dyspnea on exertion    Relevant Medications    albuterol (ProAir HFA) 90 mcg/actuation inhaler    Other Relevant Orders    Portable Oxygen Condenser    Pneumonia    Current Assessment & Plan     Patient was just admitted to the hospital community-acquired pneumonia, and has completed his antibiotic  treatment.  However he is still wheezing on exam today.  -We will repeat his lab work, CBC and CMP.  -Will repeat prednisone burst 40 mg x 5 days.         Relevant Orders    CBC and Auto Differential (Completed)    Comprehensive Metabolic Panel (Completed)    Acute hypoxemic respiratory failure (Multi)    Relevant Orders    Portable Oxygen Condenser    Hospital discharge follow-up - Primary    Current Assessment & Plan     Patient presents for hospital follow-up diffuse discharge from [place] 12/29 with community-acquired pneumonia in his left lower lobe.  He had a COPD exacerbation at that time, and was discharged on oxygen.  He has portable tanks that he has been using, but does not have a portable concentrator.  He has a oxygen concentrator at home.  He ideally wants to come off of his oxygen today, however he is currently using 4 L.  -We will attempt to get patient a portable oxygen concentrator.  -I will also have him Jyoti will reach out to him to make sure he is getting the things he needs from home care.  He recommended we call his house phone.  He is [facility] using [facility] as his [facility].  -Today he is still wheezing on exam, we will repeat his prednisone burst.  -At this time we will continue his home oxygen, and I recommend he follow-up with his pulmonology provider when he can come off home oxygen.         Family history of prostate cancer    Current Assessment & Plan     Patient has a family history of prostate cancer in his brother, who was just diagnosed epistatic at that time.  -Patient elects for prostate cancer screening today with PSA.         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  He has noticed these last couple months of having abnormalities along the areas of the mesh, see picture in  in the note.  I suspect that he is  rejecting the suture, there is no suture at the skin level that was visualized today.  -Will refer patient to general surgery for evaluation of this.         Relevant Orders    Referral to General Surgery     Other Visit Diagnoses       Allergic rhinitis, unspecified seasonality, unspecified trigger        Relevant Medications    azelastine (Astelin) 137 mcg (0.1 %) nasal spray    Vasomotor rhinitis        Relevant Medications    ipratropium (Atrovent) 21 mcg (0.03 %) nasal spray    COPD exacerbation (Multi)        Relevant Orders    Portable Oxygen Condenser    Encounter for screening for malignant neoplasm of prostate        Relevant Orders    Prostate Spec.Ag,Screen            TCM 14d      Follow-Up Recommendations: 2mo follow-up    Please excuse any typos or grammatical errors, part of this note was constructed with Dragon dictation software.    Nishi Adkins DO, MSEd  [facility]   Office: [phone]  1/8/2025 8:04 PM

## 2025-01-09 ENCOUNTER — TELEPHONE (OUTPATIENT)
Dept: PRIMARY CARE | Facility: CLINIC | Age: 89
End: 2025-01-09
Payer: MEDICARE

## 2025-01-09 NOTE — TELEPHONE ENCOUNTER
Patient called and said he does not have mychart because he can not see it (literally).  He would like you to call him if possible.  He said he does not remember your instructions from his appt and would like to confirm with you personally.  Are you able to call?

## 2025-01-09 NOTE — TELEPHONE ENCOUNTER
Patient called this morning and said that he has already received a call for the chair lift and thanks you.

## 2025-01-10 ENCOUNTER — PATIENT OUTREACH (OUTPATIENT)
Dept: PRIMARY CARE | Facility: CLINIC | Age: 89
End: 2025-01-10
Payer: MEDICARE

## 2025-01-15 DIAGNOSIS — M17.0 PRIMARY OSTEOARTHRITIS OF BOTH KNEES: ICD-10-CM

## 2025-01-17 ENCOUNTER — LAB (OUTPATIENT)
Dept: LAB | Facility: LAB | Age: 89
End: 2025-01-17
Payer: MEDICARE

## 2025-01-17 DIAGNOSIS — Z12.5 ENCOUNTER FOR SCREENING FOR MALIGNANT NEOPLASM OF PROSTATE: ICD-10-CM

## 2025-01-17 DIAGNOSIS — Z12.5 SCREENING PSA (PROSTATE SPECIFIC ANTIGEN): ICD-10-CM

## 2025-01-17 DIAGNOSIS — J18.9 PNEUMONIA OF LEFT LOWER LOBE DUE TO INFECTIOUS ORGANISM: ICD-10-CM

## 2025-01-17 DIAGNOSIS — Z80.42 FAMILY HISTORY OF PROSTATE CANCER: ICD-10-CM

## 2025-01-17 PROCEDURE — 85025 COMPLETE CBC W/AUTO DIFF WBC: CPT

## 2025-01-17 PROCEDURE — 80053 COMPREHEN METABOLIC PANEL: CPT

## 2025-01-17 PROCEDURE — G0103 PSA SCREENING: HCPCS

## 2025-01-18 DIAGNOSIS — E11.40 TYPE 2 DIABETES MELLITUS WITH DIABETIC NEUROPATHY, WITHOUT LONG-TERM CURRENT USE OF INSULIN: ICD-10-CM

## 2025-01-18 LAB
ALBUMIN SERPL BCP-MCNC: 3.8 G/DL (ref 3.4–5)
ALP SERPL-CCNC: 46 U/L (ref 33–136)
ALT SERPL W P-5'-P-CCNC: 13 U/L (ref 10–52)
ANION GAP SERPL CALC-SCNC: 14 MMOL/L (ref 10–20)
AST SERPL W P-5'-P-CCNC: 15 U/L (ref 9–39)
BASOPHILS # BLD AUTO: 0.05 X10*3/UL (ref 0–0.1)
BASOPHILS NFR BLD AUTO: 0.7 %
BILIRUB SERPL-MCNC: 0.5 MG/DL (ref 0–1.2)
BUN SERPL-MCNC: 25 MG/DL (ref 6–23)
CALCIUM SERPL-MCNC: 8.9 MG/DL (ref 8.6–10.6)
CHLORIDE SERPL-SCNC: 99 MMOL/L (ref 98–107)
CO2 SERPL-SCNC: 30 MMOL/L (ref 21–32)
CREAT SERPL-MCNC: 1 MG/DL (ref 0.5–1.3)
EGFRCR SERPLBLD CKD-EPI 2021: 72 ML/MIN/1.73M*2
EOSINOPHIL # BLD AUTO: 0.2 X10*3/UL (ref 0–0.4)
EOSINOPHIL NFR BLD AUTO: 2.7 %
ERYTHROCYTE [DISTWIDTH] IN BLOOD BY AUTOMATED COUNT: 15 % (ref 11.5–14.5)
GLUCOSE SERPL-MCNC: 97 MG/DL (ref 74–99)
HCT VFR BLD AUTO: 36.6 % (ref 41–52)
HGB BLD-MCNC: 11.9 G/DL (ref 13.5–17.5)
IMM GRANULOCYTES # BLD AUTO: 0.05 X10*3/UL (ref 0–0.5)
IMM GRANULOCYTES NFR BLD AUTO: 0.7 % (ref 0–0.9)
LYMPHOCYTES # BLD AUTO: 1.55 X10*3/UL (ref 0.8–3)
LYMPHOCYTES NFR BLD AUTO: 20.7 %
MCH RBC QN AUTO: 30.5 PG (ref 26–34)
MCHC RBC AUTO-ENTMCNC: 32.5 G/DL (ref 32–36)
MCV RBC AUTO: 94 FL (ref 80–100)
MONOCYTES # BLD AUTO: 0.98 X10*3/UL (ref 0.05–0.8)
MONOCYTES NFR BLD AUTO: 13.1 %
NEUTROPHILS # BLD AUTO: 4.66 X10*3/UL (ref 1.6–5.5)
NEUTROPHILS NFR BLD AUTO: 62.1 %
NRBC BLD-RTO: 0 /100 WBCS (ref 0–0)
PLATELET # BLD AUTO: 283 X10*3/UL (ref 150–450)
POTASSIUM SERPL-SCNC: 4.4 MMOL/L (ref 3.5–5.3)
PROT SERPL-MCNC: 6.1 G/DL (ref 6.4–8.2)
PSA SERPL-MCNC: 2.41 NG/ML
RBC # BLD AUTO: 3.9 X10*6/UL (ref 4.5–5.9)
SODIUM SERPL-SCNC: 139 MMOL/L (ref 136–145)
WBC # BLD AUTO: 7.5 X10*3/UL (ref 4.4–11.3)

## 2025-01-18 RX ORDER — HYDROCODONE BITARTRATE AND ACETAMINOPHEN 5; 325 MG/1; MG/1
1 TABLET ORAL EVERY 8 HOURS PRN
Qty: 90 TABLET | Refills: 0 | Status: SHIPPED | OUTPATIENT
Start: 2025-01-18

## 2025-01-18 NOTE — TELEPHONE ENCOUNTER
BRIEF NOTE    Subjective/Objective:  Patient is new to me as PCP, as Dr. Sanchez is leaving the practice in January.     Patient called office requesting refill of [place]. Patient has been getting this medication since August 2024. He has been on this dose of the medication for 2 months so far. His labs were reviewed. OARRS was reviewed 1/18/2025 10:13 AM.     Patient had a CSA completed in August 2024 with prior PCP.     Assessment/Plan:  1. Primary osteoarthritis of both knees  - Will follow-up this dose of medication with patient at March appt, may need to refer to pain mgmt if going to continue this.   - HYDROcodone-acetaminophen (Norco) 5-325 mg tablet; Take 1 tablet by mouth every 8 hours if needed for severe pain (7 - 10). Do not drive or operate heavy machinery while using this medication.  Dispense: 90 tablet; Refill: 0      No problem-specific Assessment & Plan notes found for this encounter.        Nishi Adkins DO, Oscar  [facility]  Office: [phone]  1/18/2025 10:12 AM

## 2025-01-19 DIAGNOSIS — M17.0 PRIMARY OSTEOARTHRITIS OF BOTH KNEES: ICD-10-CM

## 2025-01-19 PROBLEM — T85.898A ABDOMINAL ADHESIONS DUE TO IMPLANTED MESH: Status: ACTIVE | Noted: 2025-01-19

## 2025-01-19 PROBLEM — K66.0 ABDOMINAL ADHESIONS DUE TO IMPLANTED MESH: Status: ACTIVE | Noted: 2025-01-19

## 2025-01-19 RX ORDER — METFORMIN HYDROCHLORIDE 500 MG/1
1500 TABLET, EXTENDED RELEASE ORAL DAILY
Qty: 270 TABLET | Refills: 1 | Status: SHIPPED | OUTPATIENT
Start: 2025-01-19

## 2025-01-19 NOTE — TELEPHONE ENCOUNTER
BRIEF NOTE    Subjective/Objective:  Pharmacy refill request for metformin.     Assessment/Plan:  1. Type 2 diabetes mellitus with diabetic neuropathy, without long-term current use of insulin  - metFORMIN  mg 24 hr tablet; Take 3 tablets (1,500 mg) by mouth once daily.  Dispense: 270 tablet; Refill: 1      No problem-specific Assessment & Plan notes found for this encounter.        Nishi Adkins DO, MSEd  [facility]  Office: [phone]  1/19/2025 8:18 AM

## 2025-01-20 RX ORDER — HYDROCODONE BITARTRATE AND ACETAMINOPHEN 5; 325 MG/1; MG/1
TABLET ORAL
Qty: 90 TABLET | Refills: 0 | OUTPATIENT
Start: 2025-01-20

## 2025-01-20 RX ORDER — HYDROCODONE BITARTRATE AND ACETAMINOPHEN 5; 325 MG/1; MG/1
1 TABLET ORAL EVERY 8 HOURS PRN
Qty: 90 TABLET | Refills: 0 | Status: CANCELLED | OUTPATIENT
Start: 2025-01-20

## 2025-01-20 NOTE — TELEPHONE ENCOUNTER
Pt called in requesting refill om HYDROcodone-acetaminophen (Norco) 5-325 mg tablet sent over to pharmacy soon as possible he's currently been out medication for few days now .

## 2025-01-20 NOTE — ASSESSMENT & PLAN NOTE
Patient presents for hospital follow-up diffuse discharge from [place] 12/29 with community-acquired pneumonia in his left lower lobe.  He had a COPD exacerbation at that time, and was discharged on oxygen.  He has portable tanks that he has been using, but does not have a portable concentrator.  He has a oxygen concentrator at home.  He ideally wants to come off of his oxygen today, however he is currently using 4 L.  -We will attempt to get patient a portable oxygen concentrator.  -I will also have him Jyoti will reach out to him to make sure he is getting the things he needs from home care.  He recommended we call his house phone.  He is [facility] using [facility] as his [facility].  -Today he is still wheezing on exam, we will repeat his prednisone burst.  -At this time we will continue his home oxygen, and I recommend he follow-up with his pulmonology provider when he can come off home oxygen.

## 2025-01-20 NOTE — ASSESSMENT & PLAN NOTE
Patient has a family history of prostate cancer in his brother, who was just diagnosed epistatic at that time.  -Patient elects for prostate cancer screening today with PSA.

## 2025-01-20 NOTE — ASSESSMENT & PLAN NOTE
Patient was just admitted to the hospital community-acquired pneumonia, and has completed his antibiotic treatment.  However he is still wheezing on exam today.  -We will repeat his lab work, CBC and CMP.  -Will repeat prednisone burst 40 mg x 5 days.

## 2025-01-20 NOTE — TELEPHONE ENCOUNTER
Please call patient back and let him know the medication was sent over the weekend on 1/18/25. Have him call pharmacy to check if ready for .     Nishi dAkins DO, MSEd  [facility]  Office: [phone]  1/20/2025 12:02 PM

## 2025-01-20 NOTE — TELEPHONE ENCOUNTER
Elsy called patient 1/20/2025 and gave message, he is going to call pharmacy.     Nishi Adkins DO, Oscar  [facility]  Office: [phone]  1/20/2025 4:06 PM

## 2025-01-20 NOTE — ASSESSMENT & PLAN NOTE
Patient has a history of an umbilical/ventral hernia repair with mesh in the past.  He has noticed these last couple months of having abnormalities along the areas of the mesh, see picture in  in the note.  I suspect that he is rejecting the suture, there is no suture at the skin level that was visualized today.  -Will refer patient to general surgery for evaluation of this.

## 2025-01-22 NOTE — TELEPHONE ENCOUNTER
Please call patient back and let him know that I got his labs back. Everything looks good! His labs have improved since his hospital admission. His blood counts have improved and his sodium is much much better!    I have placed an order for him to get that portable oxygen condenser, they should be reaching out to him soon about that, so he won't have to carry his tanks. Make sure he follows up with his pulmonologist. We put in a referral to general surgery to get his belly looked at where his prior surgery was, you can offer to transfer him to [facility] to get him scheduled.     Let me know if you have questions.     Nishi Adkins DO, Oscar  [facility]  Office: [phone]  1/22/2025 10:42 AM

## 2025-01-27 ENCOUNTER — PATIENT OUTREACH (OUTPATIENT)
Dept: PRIMARY CARE | Facility: CLINIC | Age: 89
End: 2025-01-27
Payer: MEDICARE

## 2025-01-28 ENCOUNTER — DOCUMENTATION (OUTPATIENT)
Dept: PRIMARY CARE | Facility: CLINIC | Age: 89
End: 2025-01-28
Payer: MEDICARE

## 2025-01-29 ENCOUNTER — PATIENT OUTREACH (OUTPATIENT)
Dept: PRIMARY CARE | Facility: CLINIC | Age: 89
End: 2025-01-29
Payer: MEDICARE

## 2025-01-29 VITALS — HEIGHT: 65 IN | WEIGHT: 173 LBS | BODY MASS INDEX: 28.82 KG/M2

## 2025-01-29 DIAGNOSIS — J96.01 ACUTE HYPOXEMIC RESPIRATORY FAILURE (MULTI): ICD-10-CM

## 2025-01-29 DIAGNOSIS — Z09 HOSPITAL DISCHARGE FOLLOW-UP: ICD-10-CM

## 2025-01-29 DIAGNOSIS — E11.40 TYPE 2 DIABETES MELLITUS WITH DIABETIC NEUROPATHY, WITHOUT LONG-TERM CURRENT USE OF INSULIN: ICD-10-CM

## 2025-01-29 ASSESSMENT — ACTIVITIES OF DAILY LIVING (ADL): BATHING: YES

## 2025-01-29 NOTE — PROGRESS NOTES
Patient introduced to Chronic Care Management Services   Patient opted into services on 1/29  Services will be performed under the direction of PCP: Anoop________  Patient has planned  was done  I have discussed the nature and availability of the service with the patient, the patient's responsibility for potential cost sharing, only one practitioner furnishing services during a calendar month, and the right to stop services at any time.    Discuss DM and glucose control. On o2 4 liters  O2 sat 98%  has follow-up with pulmonologist next month.

## 2025-02-03 DIAGNOSIS — N40.1 BPH WITH OBSTRUCTION/LOWER URINARY TRACT SYMPTOMS: ICD-10-CM

## 2025-02-03 DIAGNOSIS — N13.8 BPH WITH OBSTRUCTION/LOWER URINARY TRACT SYMPTOMS: ICD-10-CM

## 2025-02-03 RX ORDER — TAMSULOSIN HYDROCHLORIDE 0.4 MG/1
0.8 CAPSULE ORAL NIGHTLY
Qty: 180 CAPSULE | Refills: 1 | Status: SHIPPED | OUTPATIENT
Start: 2025-02-03

## 2025-02-03 NOTE — TELEPHONE ENCOUNTER
BRIEF NOTE    Subjective/Objective:  Pharmacy refill request for Flomax.     Assessment/Plan:  1. BPH with obstruction/lower urinary tract symptoms  - tamsulosin (Flomax) 0.4 mg 24 hr capsule; Take 2 capsules (0.8 mg) by mouth once daily at bedtime.  Dispense: 180 capsule; Refill: 1      No problem-specific Assessment & Plan notes found for this encounter.        Nishi Adkins DO, JASMYNd  [facility]  Office: [phone]  2/3/2025 4:06 PM

## 2025-02-21 ENCOUNTER — TELEPHONE (OUTPATIENT)
Dept: PRIMARY CARE | Facility: CLINIC | Age: 89
End: 2025-02-21

## 2025-02-21 NOTE — TELEPHONE ENCOUNTER
Pt called in today wanting to update you and let you know he's been taken off oxygen and wanted to know does he keep materials or send them back

## 2025-02-23 NOTE — TELEPHONE ENCOUNTER
I would recommend he reach out to his DME supply company and pulmonology team. If pulmonology team says he can return the oxygen, then would recommend he call oxygen / DME supply company and have them come get everything.     Nishi Adkins DO, Oscar  [facility]  Office: [phone]  2/23/2025 11:22 AM

## 2025-02-25 NOTE — TELEPHONE ENCOUNTER
Called and spoke with pt and gave him the message/information from Dr. Adkins and pt states that you have to give the supply/oxygen company a call to come and get the oxygen because it is some type of controlled substance/drug. Pt states that he called and this is what he was told. Pt also states that Dr. Sanchez gave him an order to get a stair lift, and someone was supposed to come out and give him estimates and what not and he still has not heard from anyone and is wanting to know if you were able to help with this. Please advise.

## 2025-02-27 ENCOUNTER — PATIENT OUTREACH (OUTPATIENT)
Dept: PRIMARY CARE | Facility: CLINIC | Age: 89
End: 2025-02-27
Payer: MEDICARE

## 2025-02-27 DIAGNOSIS — E55.9 VITAMIN D DEFICIENCY: ICD-10-CM

## 2025-02-27 DIAGNOSIS — E11.40 TYPE 2 DIABETES MELLITUS WITH DIABETIC NEUROPATHY, WITHOUT LONG-TERM CURRENT USE OF INSULIN: ICD-10-CM

## 2025-02-28 ENCOUNTER — PATIENT OUTREACH (OUTPATIENT)
Dept: PRIMARY CARE | Facility: CLINIC | Age: 89
End: 2025-02-28
Payer: MEDICARE

## 2025-02-28 DIAGNOSIS — J96.01 ACUTE HYPOXEMIC RESPIRATORY FAILURE (MULTI): ICD-10-CM

## 2025-02-28 DIAGNOSIS — E78.2 MIXED HYPERLIPIDEMIA: ICD-10-CM

## 2025-02-28 RX ORDER — CHOLECALCIFEROL (VITAMIN D3) 50 MCG
2000 TABLET ORAL DAILY
Qty: 90 TABLET | Refills: 1 | Status: SHIPPED | OUTPATIENT
Start: 2025-02-28

## 2025-02-28 RX ORDER — DULOXETIN HYDROCHLORIDE 30 MG/1
30 CAPSULE, DELAYED RELEASE ORAL DAILY
Qty: 90 CAPSULE | Refills: 0 | Status: SHIPPED | OUTPATIENT
Start: 2025-02-28

## 2025-02-28 NOTE — TELEPHONE ENCOUNTER
BRIEF NOTE    Subjective/Objective:  Pharmacy refill request for vit d and Cymbalta.     Assessment/Plan:  1. Type 2 diabetes mellitus with diabetic neuropathy, without long-term current use of insulin  - DULoxetine (Cymbalta) 30 mg DR capsule; Take 1 capsule (30 mg) by mouth once daily.  Dispense: 90 capsule; Refill: 0    2. Vitamin D deficiency  - Will decrease dose now from 5000 international units  daily to 2000 international units  daily.   - cholecalciferol (Vitamin D-3) 50 MCG (2000 UT) tablet; Take 1 tablet (2,000 Units) by mouth once daily.  Dispense: 90 tablet; Refill: 1      No problem-specific Assessment & Plan notes found for this encounter.        Nishi Adkins DO, MSEd  [facility]  Office: [phone]  2/28/2025 10:16 AM

## 2025-02-28 NOTE — PROGRESS NOTES
Farhad said I called on a sad day. His brother passed away. They are burying him today..  Support given.  Farhad continues to eat well and healthy. He said hr has a good appetite.  Asim has not had any shortness of breath. He is no longer on oxygen his saturation is in the high 90's.  He is able to obtain and take his medications.  Encouraged him to reach out with any needs.

## 2025-02-28 NOTE — TELEPHONE ENCOUNTER
Find out who is Oxygen supply company is and call them to see if they are able to  the equipment or if they need cancellation rx from us.     Israel do you know anything about this stair lift issue. I don't think we typically provide them with a list of people that do it, but didn't know if you had a list.     Nishi Adkins DO, MSEd  [facility]  Office: [phone]  2/28/2025 9:24 AM

## 2025-03-05 ENCOUNTER — PATIENT OUTREACH (OUTPATIENT)
Dept: PRIMARY CARE | Facility: CLINIC | Age: 89
End: 2025-03-05
Payer: MEDICARE

## 2025-03-05 DIAGNOSIS — J96.01 ACUTE HYPOXEMIC RESPIRATORY FAILURE (MULTI): Primary | ICD-10-CM

## 2025-03-05 NOTE — PROGRESS NOTES
"BRIEF NOTE    Subjective/Objective:  Received message from Israel \"please write order to d/c oxygen to [employer] [phone].\"    Assessment/Plan:  1. Acute hypoxemic respiratory failure (Multi) (Primary)  - Miscellaneous DME: Discontinue oxygen. Oxygen and oxygen products can be returned to the home care company.   - Will fax to [facility].     No problem-specific Assessment & Plan notes found for this encounter.        Nishi Adkins DO, JASMYNd  [facility]  Office: [phone]  3/5/2025 1:55 PM      "

## 2025-03-09 DIAGNOSIS — E55.9 VITAMIN D DEFICIENCY: ICD-10-CM

## 2025-03-12 RX ORDER — ACETAMINOPHEN 500 MG
TABLET ORAL
Qty: 90 TABLET | Refills: 0 | OUTPATIENT
Start: 2025-03-12

## 2025-03-12 NOTE — TELEPHONE ENCOUNTER
BRIEF NOTE    Subjective/Objective:  Pharmacy refill request for Vit D 5000. Vit D was reduced at last fill down to 2000, d/t renal function.     Assessment/Plan:  1. Vitamin D deficiency  - Vit D was reduced at last fill.       No problem-specific Assessment & Plan notes found for this encounter.        Nishi Adkins DO, MSEd  [facility]  Office: [phone]  3/12/2025 1:33 PM

## 2025-03-13 DIAGNOSIS — I10 BENIGN ESSENTIAL HYPERTENSION: ICD-10-CM

## 2025-03-13 DIAGNOSIS — I42.9 CARDIOMYOPATHY, UNSPECIFIED TYPE (MULTI): ICD-10-CM

## 2025-03-14 RX ORDER — LISINOPRIL 10 MG/1
10 TABLET ORAL DAILY
Qty: 90 TABLET | Refills: 1 | Status: SHIPPED | OUTPATIENT
Start: 2025-03-14

## 2025-03-14 RX ORDER — FUROSEMIDE 20 MG/1
20 TABLET ORAL DAILY
Qty: 90 TABLET | Refills: 1 | Status: SHIPPED | OUTPATIENT
Start: 2025-03-14

## 2025-03-14 NOTE — TELEPHONE ENCOUNTER
BRIEF NOTE    Subjective/Objective:  Pharmacy refill request for Lisinopril and Lasix.     Assessment/Plan:  1. Cardiomyopathy, unspecified type (Multi)  - furosemide (Lasix) 20 mg tablet; Take 1 tablet (20 mg) by mouth once daily.  Dispense: 90 tablet; Refill: 1    2. Benign essential hypertension  - lisinopril 10 mg tablet; Take 1 tablet (10 mg) by mouth once daily.  Dispense: 90 tablet; Refill: 1      No problem-specific Assessment & Plan notes found for this encounter.        Nishi Adkins DO, Oscar  [facility]  Office: [phone]  3/14/2025 10:29 AM

## 2025-03-25 ENCOUNTER — PATIENT OUTREACH (OUTPATIENT)
Dept: PRIMARY CARE | Facility: CLINIC | Age: 89
End: 2025-03-25
Payer: MEDICARE

## 2025-03-27 ENCOUNTER — APPOINTMENT (OUTPATIENT)
Dept: PRIMARY CARE | Facility: CLINIC | Age: 89
End: 2025-03-27
Payer: MEDICARE

## 2025-03-28 ENCOUNTER — PATIENT OUTREACH (OUTPATIENT)
Dept: PRIMARY CARE | Facility: CLINIC | Age: 89
End: 2025-03-28
Payer: MEDICARE

## 2025-03-28 DIAGNOSIS — E78.2 MIXED HYPERLIPIDEMIA: ICD-10-CM

## 2025-03-28 DIAGNOSIS — J96.01 ACUTE HYPOXEMIC RESPIRATORY FAILURE: ICD-10-CM

## 2025-03-28 NOTE — PROGRESS NOTES
Spoke with Farhad. He states he has a follow-up with Iram in April as she was so behind when he was scheduled earlier this month. Support given Farhad verbalized understanding.  He has not had any issues with shortness of breath. He has not had any problems taking or obtaining his medication. They did come  his no longer needed oxygen supplies.  He states he has no issues eating and is eating healthy.  Does not need any refills. Encouraged him to reach out with any concerns.

## 2025-04-02 PROBLEM — E66.811 OBESITY, CLASS I, BMI 30-34.9: Status: RESOLVED | Noted: 2018-06-28 | Resolved: 2025-04-02

## 2025-04-03 DIAGNOSIS — N40.1 BPH WITH OBSTRUCTION/LOWER URINARY TRACT SYMPTOMS: ICD-10-CM

## 2025-04-03 DIAGNOSIS — N13.8 BPH WITH OBSTRUCTION/LOWER URINARY TRACT SYMPTOMS: ICD-10-CM

## 2025-04-03 DIAGNOSIS — I10 BENIGN ESSENTIAL HYPERTENSION: ICD-10-CM

## 2025-04-03 RX ORDER — METOPROLOL SUCCINATE 50 MG/1
50 TABLET, EXTENDED RELEASE ORAL DAILY
Qty: 90 TABLET | Refills: 0 | Status: SHIPPED | OUTPATIENT
Start: 2025-04-03

## 2025-04-03 RX ORDER — FINASTERIDE 5 MG/1
5 TABLET, FILM COATED ORAL DAILY
Qty: 90 TABLET | Refills: 0 | Status: SHIPPED | OUTPATIENT
Start: 2025-04-03

## 2025-04-16 PROBLEM — R05.9 COUGH: Status: RESOLVED | Noted: 2024-04-03 | Resolved: 2025-04-16

## 2025-04-17 ENCOUNTER — APPOINTMENT (OUTPATIENT)
Dept: PRIMARY CARE | Facility: CLINIC | Age: 89
End: 2025-04-17
Payer: MEDICARE

## 2025-04-18 ENCOUNTER — APPOINTMENT (OUTPATIENT)
Dept: CARDIOLOGY | Facility: CLINIC | Age: 89
End: 2025-04-18
Payer: MEDICARE

## 2025-04-21 ENCOUNTER — PATIENT OUTREACH (OUTPATIENT)
Dept: PRIMARY CARE | Facility: CLINIC | Age: 89
End: 2025-04-21
Payer: MEDICARE

## 2025-04-24 ENCOUNTER — PATIENT OUTREACH (OUTPATIENT)
Dept: PRIMARY CARE | Facility: CLINIC | Age: 89
End: 2025-04-24

## 2025-04-24 ENCOUNTER — OFFICE VISIT (OUTPATIENT)
Dept: CARDIOLOGY | Facility: CLINIC | Age: 89
End: 2025-04-24
Payer: MEDICARE

## 2025-04-24 VITALS
DIASTOLIC BLOOD PRESSURE: 76 MMHG | HEART RATE: 60 BPM | HEIGHT: 65 IN | SYSTOLIC BLOOD PRESSURE: 146 MMHG | WEIGHT: 165.6 LBS | BODY MASS INDEX: 27.59 KG/M2 | OXYGEN SATURATION: 96 %

## 2025-04-24 DIAGNOSIS — I10 PRIMARY HYPERTENSION: Chronic | ICD-10-CM

## 2025-04-24 DIAGNOSIS — I42.0 DILATED CARDIOMYOPATHY (MULTI): Chronic | ICD-10-CM

## 2025-04-24 DIAGNOSIS — I49.3 FREQUENT PVCS: Chronic | ICD-10-CM

## 2025-04-24 DIAGNOSIS — I25.10 CORONARY ARTERY DISEASE INVOLVING NATIVE CORONARY ARTERY OF NATIVE HEART WITHOUT ANGINA PECTORIS: Chronic | ICD-10-CM

## 2025-04-24 DIAGNOSIS — E78.2 MIXED HYPERLIPIDEMIA: Chronic | ICD-10-CM

## 2025-04-24 DIAGNOSIS — R60.0 EDEMA OF BOTH LOWER LEGS DUE TO PERIPHERAL VENOUS INSUFFICIENCY: ICD-10-CM

## 2025-04-24 DIAGNOSIS — I10 BENIGN ESSENTIAL HYPERTENSION: Primary | ICD-10-CM

## 2025-04-24 DIAGNOSIS — I87.2 EDEMA OF BOTH LOWER LEGS DUE TO PERIPHERAL VENOUS INSUFFICIENCY: ICD-10-CM

## 2025-04-24 PROCEDURE — 93005 ELECTROCARDIOGRAM TRACING: CPT | Performed by: INTERNAL MEDICINE

## 2025-04-24 PROCEDURE — 99214 OFFICE O/P EST MOD 30 MIN: CPT | Performed by: INTERNAL MEDICINE

## 2025-04-24 PROCEDURE — 3078F DIAST BP <80 MM HG: CPT | Performed by: INTERNAL MEDICINE

## 2025-04-24 PROCEDURE — 1036F TOBACCO NON-USER: CPT | Performed by: INTERNAL MEDICINE

## 2025-04-24 PROCEDURE — 3077F SYST BP >= 140 MM HG: CPT | Performed by: INTERNAL MEDICINE

## 2025-04-24 PROCEDURE — 1159F MED LIST DOCD IN RCRD: CPT | Performed by: INTERNAL MEDICINE

## 2025-04-24 PROCEDURE — 1160F RVW MEDS BY RX/DR IN RCRD: CPT | Performed by: INTERNAL MEDICINE

## 2025-04-24 ASSESSMENT — ENCOUNTER SYMPTOMS
DEPRESSION: 1
OCCASIONAL FEELINGS OF UNSTEADINESS: 1
LOSS OF SENSATION IN FEET: 1

## 2025-04-24 NOTE — PROGRESS NOTES
Referred by No ref. provider found    HPI I am seeing Farhad for a yearly follow-up.  Admitted to the hospital twice for pneumonia.  No shortness of breath at this time.  Off oxygen.  No chest pains.    Past Medical History:  Problem List Items Addressed This Visit    None     Past Medical History:   Diagnosis Date    Abrasion of unspecified upper arm, initial encounter 04/28/2015    Arm abrasion    Anxiety disorder, unspecified 07/06/2016    Anxiety and depression    CAD (coronary artery disease) 02/27/2023    Incidental cor calcification noted on CAT scan      Cardiomyopathy 02/27/2023    EF 40-45% on echo 10/13/2020  50% on echo 10/19/2021  Now 60-65% (8/15/2023 echo)    Chronic sinusitis, unspecified 11/30/2016    Recurrent sinusitis    Encounter for screening for malignant neoplasm of prostate     Prostate cancer screening    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 Sanchez follows    Mitral regurgitation 02/27/2023    Mild to mod    Other conditions influencing health status     Normal colonoscopy    Other conditions influencing health status 11/08/2017    History of cough    Pain in left shoulder 07/06/2016    Left shoulder pain    Pain in right leg 12/22/2016    Bilateral leg pain    Personal history of other diseases of the respiratory system 11/08/2017    History of sinusitis    Personal history of other diseases of the respiratory system 12/01/2014    History of sinusitis    Type 2 diabetes mellitus 07/11/2019      Past Surgical History:  He has a past surgical history that includes Other surgical history (02/13/2014); Hernia repair (02/13/2014); Other surgical history (05/21/2014); Other surgical history (09/30/2020); and Other surgical history (09/30/2020).      Social History:  He reports that he quit smoking about 70 years ago. His smoking use included cigarettes. He started smoking about 71  years ago. He has a 0.5 pack-year smoking history. He has been exposed to tobacco smoke. He has never used smokeless tobacco. He reports that he does not currently use alcohol. He reports that he does not use drugs.    Family History:  Family History   Problem Relation Name Age of Onset    Heart disease Mother      Heart attack Father      Heart attack Brother      Prostate cancer Brother          Stage 4 at diagnosis     Allergies:  Amlodipine and Gabapentin    Outpatient Medications:  Current Outpatient Medications   Medication Instructions    albuterol (ProAir HFA) 90 mcg/actuation inhaler 2 puffs, inhalation, Every 4 hours PRN    albuterol-budesonide (Airsupra) 90-80 mcg/actuation inhaler Inhale.    azelastine (Astelin) 137 mcg (0.1 %) nasal spray 2 sprays, Each Nostril, 2 times daily, Use in each nostril as directed    blood sugar diagnostic (OneTouch Ultra Test) strip Test 1-2 times daily    blood-glucose meter (OneTouch Ultra2 Meter) misc Use as directed for sugar checks, once daily.    cholecalciferol (VITAMIN D-3) 2,000 Units, oral, Daily    cyanocobalamin, vitamin B-12, (Vitamin B-12) 1,000 mcg tablet extended release 1 tablet, oral, Every Day    docusate sodium (COLACE) 100 mg, Every 12 hours PRN    DULoxetine (CYMBALTA) 30 mg, oral, Daily    finasteride (PROSCAR) 5 mg, oral, Daily    furosemide (LASIX) 20 mg, oral, Daily    guaiFENesin (MUCINEX) 600 mg, 2 times daily    HYDROcodone-acetaminophen (Norco) 5-325 mg tablet 1 tablet, oral, Every 8 hours PRN, Do not drive or operate heavy machinery while using this medication.    ipratropium (Atrovent) 21 mcg (0.03 %) nasal spray 2 sprays, Each Nostril, 3 times daily    levocetirizine (XYZAL) 5 mg, oral, Every evening, For allergies    lisinopril 10 mg, oral, Daily    lovastatin (MEVACOR) 40 mg, oral, Every Day    metFORMIN XR (GLUCOPHAGE-XR) 1,500 mg, oral, Daily    metoprolol succinate XL (TOPROL-XL) 50 mg, oral, Daily    montelukast (SINGULAIR) 10 mg, oral,  Every evening    omeprazole (PRILOSEC) 40 mg, oral, Daily    OneTouch UltraSoft Lancets misc TEST DAILY AS DIRECTED    oxygen (O2) gas therapy 1 each, Continuous    polyethylene glycol (GLYCOLAX, MIRALAX) 17 g, Daily RT    tamsulosin (FLOMAX) 0.8 mg, oral, Nightly    Trelegy Ellipta 200-62.5-25 mcg blister with device inhale 1 puff by mouth daily (never exceed this dose). rinse mouth after using it     Last Recorded Vitals:  There were no vitals filed for this visit.    Physical Exam  Patient is alert and oriented x3.  HEENT is unremarkable mucous members are moist  Neck no JVP no bruits upstrokes are full no thyromegaly  Lungs are clear bilaterally.  No wheezing crackles or rales  Heart regular rhythm normal S1-S2 there is no S3 no murmurs are heard.  Abdomen is soft bs are positive nontender nondistended no organomegaly no pulsatile masses  Extremities have 1+ left lower extremity edema.  Distal pulses present palpable.  Neuro is grossly nonfocal  Skin has no rashes     Last Labs:  CBC -  Lab Results   Component Value Date    WBC 7.5 01/17/2025    HGB 11.9 (L) 01/17/2025    HCT 36.6 (L) 01/17/2025    MCV 94 01/17/2025     01/17/2025     CMP -  Lab Results   Component Value Date    CALCIUM 8.9 01/17/2025    PROT 6.1 (L) 01/17/2025    ALBUMIN 3.8 01/17/2025    AST 15 01/17/2025    ALT 13 01/17/2025    ALKPHOS 46 01/17/2025    BILITOT 0.5 01/17/2025     LIPID PANEL -   Lab Results   Component Value Date    CHOL 155 08/26/2024    HDL 38.1 08/26/2024    CHHDL 4.1 08/26/2024    VLDL 26 08/26/2024    TRIG 130 08/26/2024    NHDL 117 08/26/2024     RENAL FUNCTION PANEL -   Lab Results   Component Value Date    K 4.4 01/17/2025     Lab Results   Component Value Date    BNP 41 06/03/2022    HGBA1C 5.5 12/27/2024    HGBA1C 6.2 (H) 08/26/2024        Procedure  ECHO [08/15/2023]: EF 60-65%. SD = impaired relaxation pattern.      HOLTER [08/26/2022]: SR, 42-89-55. No ep/of A-fib / PSVT / high-grade AV block. No VT.       VENOUS U/S [05/11/2022]: No DVT. Chronic changes vis'd in small saphenous vein of RLE. Lymph nodes in groin.     ECHO [10/19/2021]: Est EF 50%. SD = impaired relaxation pattern.      HOLTER [04/14/2021]: SR, 52-91-65. No ep/of A-fib / PSVT / high-grade AV block. Frequent PVCs w/short runs nonsust VT. Total PVC burden = 17.79%.     HOLTER [10/13/20 â€“ 10/15/20]: SR, -68. No ep/of A-fib / PSVT / high-grade AV block. Frequent PVCs (30,229 / 48 hours) w/runs of nonsustained VT up to 6 beats in duration.     ECHO [10/13/2020]: EF 40-45%. SD = impaired relaxation pattern. Mild mitral stenosis. Mild-mod MR. RVSP elevated at 47.8 mmHg. Global hypok.      CT [09/19/2019]: Mx stable subcm lung nodules w/o suspicious new nodule. Mild medial RML opacity, favored to reflect infectious/inflammatory bronchiolitis including poss mild aspiration. Mildly prominent but stable mediastinal nodes. Bilat renal lesions. Marked coronary athero.      PHARM NST [07/12/2018]: No ev/of stress-induced ischemia or scar. Normal LV size w/est EF 57%.      CT [09/19/2017]: Scattered subcm noncalcified pulm nodules up to 6 mm.     Assessment/Plan   1. Cardiomyopathy. EF 2020 was 45%. EF in 2021 is 50%. On the most recent echo his EF has normalized to 60 to 65%.  Minimal volume overload primarily of the left lower extremity which is chronic.  No orthopnea no PND.  No significant lower extremity edema.     2. Lower extremity edema.  Chronic.  He is currently on furosemide 20 mg daily.  Renal function is stable.  Reasonly well-controlled    3. Hyperlipidemia.  Followed by his PCP.  8/26/2024 LDL 91 HDL 38 triglycerides 130 LFTs normal    4. Hypertension.  Elevated here today at home when physical therapy comes it is typically in the 130s.    5. Shortness of breath. He has had this for a long time. He uses inhaler and it improves.  He is following up with his pulmonologist.  His BNP was 41.  This is stable    Overall from a cardiac standpoint  doing well.  My plan will be to see him back in 1 year.  EKG today  Nigel Carroll MD     Instructions and follow up

## 2025-04-24 NOTE — PATIENT INSTRUCTIONS
1. Cardiomyopathy. EF 2020 was 45%. EF in 2021 is 50%. On the most recent echo his EF has normalized to 60 to 65%.  Minimal volume overload primarily of the left lower extremity which is chronic.  No orthopnea no PND.  No significant lower extremity edema.     2. Lower extremity edema.  Chronic.  He is currently on furosemide 20 mg daily.  Renal function is stable.  Reasonly well-controlled    3. Hyperlipidemia.  Followed by his PCP.  8/26/2024 LDL 91 HDL 38 triglycerides 130 LFTs normal    4. Hypertension.  Elevated here today at home when physical therapy comes it is typically in the 130s.    5. Shortness of breath. He has had this for a long time. He uses inhaler and it improves.  He is following up with his pulmonologist.  His BNP was 41.  This is stable    Overall from a cardiac standpoint doing well.  My plan will be to see him back in 1 year.  EKG today

## 2025-04-25 ENCOUNTER — PATIENT OUTREACH (OUTPATIENT)
Dept: PRIMARY CARE | Facility: CLINIC | Age: 89
End: 2025-04-25
Payer: MEDICARE

## 2025-04-25 DIAGNOSIS — I10 PRIMARY HYPERTENSION: ICD-10-CM

## 2025-04-25 DIAGNOSIS — E11.65 TYPE 2 DIABETES MELLITUS WITH HYPERGLYCEMIA, WITHOUT LONG-TERM CURRENT USE OF INSULIN: ICD-10-CM

## 2025-04-25 DIAGNOSIS — I42.0 DILATED CARDIOMYOPATHY (MULTI): ICD-10-CM

## 2025-04-25 LAB
ATRIAL RATE: 234 BPM
Q ONSET: 221 MS
QRS COUNT: 10 BEATS
QRS DURATION: 86 MS
QT INTERVAL: 430 MS
QTC CALCULATION(BAZETT): 414 MS
QTC FREDERICIA: 420 MS
R AXIS: -5 DEGREES
T AXIS: 19 DEGREES
T OFFSET: 436 MS
VENTRICULAR RATE: 56 BPM

## 2025-04-25 NOTE — PROGRESS NOTES
Was able to speak with Farhad. He has not has any shortness of breath and remains off oxygen. He has not had any chf or chest pain.  He continues to eat healthy. His blood sugars remain Around 150 or less.  He followed up with cardiology. He is doing well and does not need seen for another week.  Blood pressure has been good. On issues at this time.  No questions or concerns at this time. Her has been able to get his medications and take them without any issues.  Encouraged him to call with any needs.

## 2025-05-19 ENCOUNTER — PATIENT OUTREACH (OUTPATIENT)
Dept: PRIMARY CARE | Facility: CLINIC | Age: 89
End: 2025-05-19
Payer: MEDICARE

## 2025-05-19 DIAGNOSIS — I42.0 DILATED CARDIOMYOPATHY (MULTI): ICD-10-CM

## 2025-05-19 DIAGNOSIS — I10 PRIMARY HYPERTENSION: ICD-10-CM

## 2025-05-19 DIAGNOSIS — E11.65 TYPE 2 DIABETES MELLITUS WITH HYPERGLYCEMIA, WITHOUT LONG-TERM CURRENT USE OF INSULIN: ICD-10-CM

## 2025-05-19 NOTE — PROGRESS NOTES
Care Management Monthly Outreach  Chart review completed  Confirmation of at least 2 patient identifiers  Change in insurance? No    Has patient been to ER/Urgent Care since last outreach? No    Last Office Visit with PCP: 1/8/2025   Next Office Visit with PCP: 8/15/2025   APC Collaboration: n/a    Chronic Conditions and Outreach Summary:   Type 2 diabetes mellitus with hyperglycemia, without long-term current use of insulin    Primary hypertension    Dilated cardiomyopathy (Multi)    Farhad states he is doing well. He has glucose 140 or less. His blood pressure has been in normal range. He states he has been eating well and making good healthy choices. No leg swelling or shortness of breath. He has been able to obtain his medications and take them without any issues.    Medications:   Are there medication changes since last visit? No  Refills needed? No    Social Drivers of Health: Deferred  Care Gaps Addressed? Deferred  Care Plan addressed: Yes    Upcoming Appointments:   Future Appointments       Date / Time Provider Department Dept Phone    8/15/2025 9:00 AM (Arrive by 8:45 AM) Nishi Adkins DO [facility] [phone]          Blood Pressures Reviewed  BP Readings from Last 3 Encounters:   04/24/25 146/76   01/08/25 166/77   08/27/24 136/72     Labs Reviewed:  Lab Results   Component Value Date    CREATININE 1.00 01/17/2025    GLUCOSE 97 01/17/2025    ALKPHOS 46 01/17/2025    K 4.4 01/17/2025    PROT 6.1 (L) 01/17/2025     01/17/2025    CALCIUM 8.9 01/17/2025    AST 15 01/17/2025    ALT 13 01/17/2025    BUN 25 (H) 01/17/2025    GFRMALE 53 (A) 06/02/2023     Lab Results   Component Value Date    TRIG 130 08/26/2024    CHOL 155 08/26/2024    LDLCALC 91 08/26/2024    HDL 38.1 08/26/2024     Lab Results   Component Value Date    HGBA1C 5.5 12/27/2024    HGBA1C 6.2 (H) 08/26/2024    HGBA1C 6.0 (H) 01/23/2024     Lab Results   Component Value Date    WBC 7.5 01/17/2025    RBC 3.90 (L)  01/17/2025    HGB 11.9 (L) 01/17/2025     01/17/2025   No other concerns at this time.  Agreeable to continue monthly outreaches.  Encouraged to call if questions or concerns arise.    CHIKA COLLADO

## 2025-05-28 DIAGNOSIS — E11.40 TYPE 2 DIABETES MELLITUS WITH DIABETIC NEUROPATHY, WITHOUT LONG-TERM CURRENT USE OF INSULIN: ICD-10-CM

## 2025-05-28 RX ORDER — DULOXETIN HYDROCHLORIDE 30 MG/1
30 CAPSULE, DELAYED RELEASE ORAL DAILY
Qty: 90 CAPSULE | Refills: 3 | Status: SHIPPED | OUTPATIENT
Start: 2025-05-28

## 2025-05-28 NOTE — TELEPHONE ENCOUNTER
BRIEF NOTE    Subjective/Objective:  Pharmacy refill request.     Assessment/Plan:  1. Type 2 diabetes mellitus with diabetic neuropathy, without long-term current use of insulin  - DULoxetine (Cymbalta) 30 mg DR capsule; Take 1 capsule (30 mg) by mouth once daily.  Dispense: 90 capsule; Refill: 3    No problem-specific Assessment & Plan notes found for this encounter.        Nishi Adkins DO, MSEd  [facility]  Office: [phone]  5/28/2025 1:49 PM

## 2025-06-19 ENCOUNTER — PATIENT OUTREACH (OUTPATIENT)
Dept: PRIMARY CARE | Facility: CLINIC | Age: 89
End: 2025-06-19
Payer: MEDICARE

## 2025-06-20 ENCOUNTER — PATIENT OUTREACH (OUTPATIENT)
Dept: PRIMARY CARE | Facility: CLINIC | Age: 89
End: 2025-06-20
Payer: MEDICARE

## 2025-06-20 DIAGNOSIS — I42.0 DILATED CARDIOMYOPATHY (MULTI): ICD-10-CM

## 2025-06-20 DIAGNOSIS — I10 PRIMARY HYPERTENSION: ICD-10-CM

## 2025-06-20 DIAGNOSIS — I42.9 CARDIOMYOPATHY, UNSPECIFIED TYPE (MULTI): ICD-10-CM

## 2025-06-20 DIAGNOSIS — E11.40 TYPE 2 DIABETES MELLITUS WITH DIABETIC NEUROPATHY, WITHOUT LONG-TERM CURRENT USE OF INSULIN: ICD-10-CM

## 2025-06-20 NOTE — PROGRESS NOTES
Care Management Monthly Outreach  Chart review completed  Confirmation of at least 2 patient identifiers  Change in insurance? No    Has patient been to ER/Urgent Care since last outreach? No    Last Office Visit with PCP: 1/8/2025   Next Office Visit with PCP: 8/15/2025   APC Collaboration: n/a    Chronic Conditions and Outreach Summary:   Type 2 diabetes mellitus with diabetic neuropathy, without long-term current use of insulin    Primary hypertension    Dilated cardiomyopathy (Multi)    Cardiomyopathy, unspecified type (Multi)    Spoke with Farhad. He states he is doing well. No chest pain or cardiac issues. He stated that this year he lost a sister and one is in the SNF rehabing from cardiac surgery. He is able to obtain and take his medications. His glucose and blood pressure have been good..    Medications:   Are there medication changes since last visit? No  Refills needed? No    Social Drivers of Health: Deferred  Care Gaps Addressed? Deferred  Care Plan addressed: No    Upcoming Appointments:   Future Appointments       Date / Time Provider Department Dept Phone    8/15/2025 9:00 AM (Arrive by 8:45 AM) Nishi Adkins DO [facility] [phone]          Blood Pressures Reviewed  BP Readings from Last 3 Encounters:   04/24/25 146/76   01/08/25 166/77   08/27/24 136/72     Labs Reviewed:  Lab Results   Component Value Date    CREATININE 1.00 01/17/2025    GLUCOSE 97 01/17/2025    ALKPHOS 46 01/17/2025    K 4.4 01/17/2025    PROT 6.1 (L) 01/17/2025     01/17/2025    CALCIUM 8.9 01/17/2025    AST 15 01/17/2025    ALT 13 01/17/2025    BUN 25 (H) 01/17/2025    GFRMALE 53 (A) 06/02/2023     Lab Results   Component Value Date    TRIG 130 08/26/2024    CHOL 155 08/26/2024    LDLCALC 91 08/26/2024    HDL 38.1 08/26/2024     Lab Results   Component Value Date    HGBA1C 5.5 12/27/2024    HGBA1C 6.2 (H) 08/26/2024    HGBA1C 6.0 (H) 01/23/2024     Lab Results   Component Value Date    WBC 7.5  01/17/2025    RBC 3.90 (L) 01/17/2025    HGB 11.9 (L) 01/17/2025     01/17/2025   No other concerns at this time.  Agreeable to continue monthly outreaches.  Encouraged to call if questions or concerns arise.    CHIKA COLLADO RN BSN

## 2025-06-21 DIAGNOSIS — K21.9 GASTROESOPHAGEAL REFLUX DISEASE, UNSPECIFIED WHETHER ESOPHAGITIS PRESENT: ICD-10-CM

## 2025-06-21 DIAGNOSIS — E55.9 VITAMIN D DEFICIENCY: ICD-10-CM

## 2025-06-21 DIAGNOSIS — N13.8 BPH WITH OBSTRUCTION/LOWER URINARY TRACT SYMPTOMS: ICD-10-CM

## 2025-06-21 DIAGNOSIS — N40.1 BPH WITH OBSTRUCTION/LOWER URINARY TRACT SYMPTOMS: ICD-10-CM

## 2025-06-21 DIAGNOSIS — J45.30 MILD PERSISTENT REACTIVE AIRWAY DISEASE WITHOUT COMPLICATION (HHS-HCC): ICD-10-CM

## 2025-06-21 DIAGNOSIS — E78.49 OTHER HYPERLIPIDEMIA: ICD-10-CM

## 2025-06-21 DIAGNOSIS — I10 BENIGN ESSENTIAL HYPERTENSION: ICD-10-CM

## 2025-06-26 RX ORDER — CHOLECALCIFEROL (VITAMIN D3) 50 MCG
50 TABLET ORAL DAILY
Qty: 90 TABLET | Refills: 0 | OUTPATIENT
Start: 2025-06-26

## 2025-06-26 RX ORDER — CHOLECALCIFEROL (VITAMIN D3) 50 MCG
50 TABLET ORAL DAILY
Qty: 90 TABLET | Refills: 3 | Status: SHIPPED | OUTPATIENT
Start: 2025-06-26

## 2025-06-26 RX ORDER — MONTELUKAST SODIUM 10 MG/1
10 TABLET ORAL EVERY EVENING
Qty: 90 TABLET | Refills: 3 | Status: SHIPPED | OUTPATIENT
Start: 2025-06-26

## 2025-06-26 RX ORDER — FINASTERIDE 5 MG/1
5 TABLET, FILM COATED ORAL DAILY
Qty: 90 TABLET | Refills: 3 | Status: SHIPPED | OUTPATIENT
Start: 2025-06-26

## 2025-06-26 RX ORDER — LOVASTATIN 40 MG/1
40 TABLET ORAL
Qty: 90 TABLET | Refills: 3 | Status: SHIPPED | OUTPATIENT
Start: 2025-06-26

## 2025-06-26 RX ORDER — METOPROLOL SUCCINATE 50 MG/1
50 TABLET, EXTENDED RELEASE ORAL DAILY
Qty: 90 TABLET | Refills: 3 | Status: SHIPPED | OUTPATIENT
Start: 2025-06-26

## 2025-06-26 RX ORDER — OMEPRAZOLE 40 MG/1
40 CAPSULE, DELAYED RELEASE ORAL DAILY
Qty: 90 CAPSULE | Refills: 3 | Status: SHIPPED | OUTPATIENT
Start: 2025-06-26

## 2025-06-26 RX ORDER — TAMSULOSIN HYDROCHLORIDE 0.4 MG/1
0.8 CAPSULE ORAL NIGHTLY
Qty: 180 CAPSULE | Refills: 3 | Status: SHIPPED | OUTPATIENT
Start: 2025-06-26

## 2025-06-26 NOTE — TELEPHONE ENCOUNTER
BRIEF NOTE    Subjective/Objective:  Pharmacy refill request.     Assessment/Plan:  1. Benign essential hypertension  - metoprolol succinate XL (Toprol-XL) 50 mg 24 hr tablet; Take 1 tablet (50 mg) by mouth once daily.  Dispense: 90 tablet; Refill: 3    2. BPH with obstruction/lower urinary tract symptoms  - finasteride (Proscar) 5 mg tablet; Take 1 tablet (5 mg) by mouth once daily.  Dispense: 90 tablet; Refill: 3  - tamsulosin (Flomax) 0.4 mg 24 hr capsule; Take 2 capsules (0.8 mg) by mouth once daily at bedtime.  Dispense: 180 capsule; Refill: 3    3. Gastroesophageal reflux disease, unspecified whether esophagitis present  - omeprazole (PriLOSEC) 40 mg DR capsule; Take 1 capsule (40 mg) by mouth once daily.  Dispense: 90 capsule; Refill: 3    4. 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)  - montelukast (Singulair) 10 mg tablet; Take 1 tablet (10 mg) by mouth once daily in the evening.  Dispense: 90 tablet; Refill: 3    5. Other hyperlipidemia  - lovastatin (Mevacor) 40 mg tablet; Take 1 tablet (40 mg) by mouth once every day.  Dispense: 90 tablet; Refill: 3    6. Vitamin D deficiency  - cholecalciferol (Vitamin D-3) 50 mcg (2,000 units) tablet; Take 1 tablet (50 mcg) by mouth once daily.  Dispense: 90 tablet; Refill: 3      No problem-specific Assessment & Plan notes found for this encounter.        Nishi Adkins DO, Oscar  [facility]  Office: [phone]  6/26/2025 5:30 PM

## 2025-06-27 ENCOUNTER — PATIENT OUTREACH (OUTPATIENT)
Dept: PRIMARY CARE | Facility: CLINIC | Age: 89
End: 2025-06-27
Payer: MEDICARE

## 2025-06-27 ENCOUNTER — TELEPHONE (OUTPATIENT)
Dept: PRIMARY CARE | Facility: CLINIC | Age: 89
End: 2025-06-27
Payer: MEDICARE

## 2025-06-27 DIAGNOSIS — E11.40 TYPE 2 DIABETES MELLITUS WITH DIABETIC NEUROPATHY, WITHOUT LONG-TERM CURRENT USE OF INSULIN: ICD-10-CM

## 2025-06-27 RX ORDER — DULOXETIN HYDROCHLORIDE 30 MG/1
30 CAPSULE, DELAYED RELEASE ORAL DAILY
Qty: 90 CAPSULE | Refills: 3 | Status: CANCELLED | OUTPATIENT
Start: 2025-06-27

## 2025-06-27 NOTE — TELEPHONE ENCOUNTER
Pt was speaking with Israel today and informed her that he needed a refill for his Duloxetine. It looks like Dr. Adkins sent this medication for a 1 year supply on 5/28/25 to [facility] in [place]. I called pt and spoke with him about this and he verbalized understanding. I let him know to give the pharmacy a call and check and see what was going on with his prescription. I also let pt know to give our office a call if he has any other issues/questions. Pt verbalized understanding.

## 2025-06-28 NOTE — TELEPHONE ENCOUNTER
Refill not appropriate. Was sent 90 days + 3 refills in May and he picked them on in May. Raisa TANG called pharmacy to confirm, see separate encounter. She talked with Farhad.     Nishi Adkins DO, Oscar  [facility]  Office: [phone]  6/28/2025 12:26 PM

## 2025-07-18 ENCOUNTER — PATIENT OUTREACH (OUTPATIENT)
Dept: PRIMARY CARE | Facility: CLINIC | Age: 89
End: 2025-07-18
Payer: MEDICARE

## 2025-07-18 DIAGNOSIS — I10 PRIMARY HYPERTENSION: ICD-10-CM

## 2025-07-18 DIAGNOSIS — I42.9 CARDIOMYOPATHY, UNSPECIFIED TYPE (MULTI): ICD-10-CM

## 2025-07-18 DIAGNOSIS — K21.9 GASTROESOPHAGEAL REFLUX DISEASE, UNSPECIFIED WHETHER ESOPHAGITIS PRESENT: ICD-10-CM

## 2025-07-18 NOTE — PROGRESS NOTES
Care Management Monthly Outreach  Chart review completed  Confirmation of at least 2 patient identifiers  Change in insurance? No    Has patient been to ER/Urgent Care since last outreach? No    Last Office Visit with PCP: 1/8/2025   Next Office Visit with PCP: 8/15/2025   APC Collaboration: n/a    Chronic Conditions and Outreach Summary:   Cardiomyopathy, unspecified type (Multi)    Primary hypertension    Gastroesophageal reflux disease, unspecified whether esophagitis present    Farhad can not find his generic prilosec. Manny Fish states he picked it up. Farhad has been pulling the lables off his medications to prevent his information being in the trash. Possibly he threw it in the trash.   CM spoke to the pharmacist. She is going to try to over-ride to provide him a refill at limited cost. CM to call this afternoon.  Farhad is eating well. Her is not having any cardiac issues. Will contact him after speaking with his pharmacy this afternoon.    Medications:   Are there medication changes since last visit? No  Refills needed? Yes -pharmacy issue.    Social Drivers of Health: Deferred  Care Gaps Addressed? Deferred  Care Plan addressed: No    Upcoming Appointments:   Future Appointments       Date / Time Provider Department Dept Phone    8/15/2025 9:00 AM (Arrive by 8:45 AM) Nishi Adkins DO [facility] [phone]          Blood Pressures Reviewed  BP Readings from Last 3 Encounters:   04/24/25 146/76   01/08/25 166/77   08/27/24 136/72     Labs Reviewed:  Lab Results   Component Value Date    CREATININE 1.00 01/17/2025    GLUCOSE 97 01/17/2025    ALKPHOS 46 01/17/2025    K 4.4 01/17/2025    PROT 6.1 (L) 01/17/2025     01/17/2025    CALCIUM 8.9 01/17/2025    AST 15 01/17/2025    ALT 13 01/17/2025    BUN 25 (H) 01/17/2025    GFRMALE 53 (A) 06/02/2023     Lab Results   Component Value Date    TRIG 130 08/26/2024    CHOL 155 08/26/2024    LDLCALC 91 08/26/2024    HDL 38.1 08/26/2024     Lab  Results   Component Value Date    HGBA1C 5.5 12/27/2024    HGBA1C 6.2 (H) 08/26/2024    HGBA1C 6.0 (H) 01/23/2024     Lab Results   Component Value Date    WBC 7.5 01/17/2025    RBC 3.90 (L) 01/17/2025    HGB 11.9 (L) 01/17/2025     01/17/2025   No other concerns at this time.  Agreeable to continue monthly outreaches.  Encouraged to call if questions or concerns arise.    CHIKA COLLADO RN BSN

## 2025-07-28 DIAGNOSIS — E11.40 TYPE 2 DIABETES MELLITUS WITH DIABETIC NEUROPATHY, WITHOUT LONG-TERM CURRENT USE OF INSULIN: ICD-10-CM

## 2025-07-29 RX ORDER — METFORMIN HYDROCHLORIDE 500 MG/1
1500 TABLET, EXTENDED RELEASE ORAL DAILY
Qty: 270 TABLET | Refills: 0 | Status: SHIPPED | OUTPATIENT
Start: 2025-07-29

## 2025-07-31 ENCOUNTER — TELEPHONE (OUTPATIENT)
Dept: PRIMARY CARE | Facility: CLINIC | Age: 89
End: 2025-07-31

## 2025-08-01 ENCOUNTER — TELEPHONE (OUTPATIENT)
Dept: PRIMARY CARE | Facility: CLINIC | Age: 89
End: 2025-08-01
Payer: MEDICARE

## 2025-08-01 DIAGNOSIS — R53.83 OTHER FATIGUE: ICD-10-CM

## 2025-08-01 DIAGNOSIS — E78.2 MIXED HYPERLIPIDEMIA: Chronic | ICD-10-CM

## 2025-08-01 DIAGNOSIS — I10 BENIGN ESSENTIAL HYPERTENSION: ICD-10-CM

## 2025-08-01 DIAGNOSIS — I25.10 CORONARY ARTERY DISEASE INVOLVING NATIVE CORONARY ARTERY OF NATIVE HEART WITHOUT ANGINA PECTORIS: Chronic | ICD-10-CM

## 2025-08-01 DIAGNOSIS — I49.3 FREQUENT PVCS: Chronic | ICD-10-CM

## 2025-08-01 DIAGNOSIS — N13.8 BPH WITH OBSTRUCTION/LOWER URINARY TRACT SYMPTOMS: ICD-10-CM

## 2025-08-01 DIAGNOSIS — D64.9 NORMOCYTIC ANEMIA: Primary | ICD-10-CM

## 2025-08-01 DIAGNOSIS — F32.A ANXIETY AND DEPRESSION: ICD-10-CM

## 2025-08-01 DIAGNOSIS — F41.9 ANXIETY AND DEPRESSION: ICD-10-CM

## 2025-08-01 DIAGNOSIS — R06.09 DYSPNEA ON EXERTION: ICD-10-CM

## 2025-08-01 DIAGNOSIS — I48.0 PAROXYSMAL ATRIAL FIBRILLATION (MULTI): ICD-10-CM

## 2025-08-01 DIAGNOSIS — N40.1 BPH WITH OBSTRUCTION/LOWER URINARY TRACT SYMPTOMS: ICD-10-CM

## 2025-08-01 DIAGNOSIS — N28.9 KIDNEY LESION, NATIVE, BILATERAL: ICD-10-CM

## 2025-08-01 DIAGNOSIS — E53.8 VITAMIN B12 DEFICIENCY: ICD-10-CM

## 2025-08-01 DIAGNOSIS — E11.65 TYPE 2 DIABETES MELLITUS WITH HYPERGLYCEMIA, WITHOUT LONG-TERM CURRENT USE OF INSULIN: Chronic | ICD-10-CM

## 2025-08-01 DIAGNOSIS — E55.9 VITAMIN D DEFICIENCY: ICD-10-CM

## 2025-08-01 NOTE — TELEPHONE ENCOUNTER
Patient called in requesting lab orders for next appointment I did not see any future labs on file.   Patient wants us to call back and let him know next steps moving forward.    Call patient back - [phone]

## 2025-08-03 NOTE — TELEPHONE ENCOUNTER
Please call patient back and let him know Your lab orders were placed. You should be fasting for your labs. You need to fast at least 8 hours, you can have black coffee or water the morning of your labs and you can take your regular medications. To schedule an appointment to get your labwork done at the [facility], please go to: [email or web address]    Nishi Adkins DO, MSEd  [facility]  Office: [phone]  8/2/2025 9:10 PM    1. Normocytic anemia (Primary)  - TSH with reflex to Free T4 if abnormal; Future  - CBC and Auto Differential; Future  - Comprehensive Metabolic Panel; Future  - Hemoglobin A1C; Future  - Lipid Panel; Future  - Vitamin B12; Future  - Folate; Future  - Iron and TIBC; Future  - Ferritin; Future  - Albumin-Creatinine Ratio, Urine Random; Future  - TSH with reflex to Free T4 if abnormal  - CBC and Auto Differential  - Comprehensive Metabolic Panel  - Hemoglobin A1C  - Lipid Panel  - Vitamin B12  - Folate  - Iron and TIBC  - Ferritin  - Albumin-Creatinine Ratio, Urine Random    2. BPH with obstruction/lower urinary tract symptoms  - TSH with reflex to Free T4 if abnormal; Future  - CBC and Auto Differential; Future  - Comprehensive Metabolic Panel; Future  - Hemoglobin A1C; Future  - Lipid Panel; Future  - Vitamin B12; Future  - Folate; Future  - Iron and TIBC; Future  - Ferritin; Future  - Albumin-Creatinine Ratio, Urine Random; Future  - TSH with reflex to Free T4 if abnormal  - CBC and Auto Differential  - Comprehensive Metabolic Panel  - Hemoglobin A1C  - Lipid Panel  - Vitamin B12  - Folate  - Iron and TIBC  - Ferritin  - Albumin-Creatinine Ratio, Urine Random    3. Coronary artery disease involving native coronary artery of native heart without angina pectoris  - TSH with reflex to Free T4 if abnormal; Future  - CBC and Auto Differential; Future  - Comprehensive Metabolic Panel; Future  - Hemoglobin A1C; Future  - Lipid Panel; Future  - Vitamin  B12; Future  - Folate; Future  - Iron and TIBC; Future  - Ferritin; Future  - Albumin-Creatinine Ratio, Urine Random; Future  - TSH with reflex to Free T4 if abnormal  - CBC and Auto Differential  - Comprehensive Metabolic Panel  - Hemoglobin A1C  - Lipid Panel  - Vitamin B12  - Folate  - Iron and TIBC  - Ferritin  - Albumin-Creatinine Ratio, Urine Random    4. Anxiety and depression  - TSH with reflex to Free T4 if abnormal; Future  - CBC and Auto Differential; Future  - Comprehensive Metabolic Panel; Future  - Hemoglobin A1C; Future  - Lipid Panel; Future  - Vitamin B12; Future  - Folate; Future  - Iron and TIBC; Future  - Ferritin; Future  - Albumin-Creatinine Ratio, Urine Random; Future  - TSH with reflex to Free T4 if abnormal  - CBC and Auto Differential  - Comprehensive Metabolic Panel  - Hemoglobin A1C  - Lipid Panel  - Vitamin B12  - Folate  - Iron and TIBC  - Ferritin  - Albumin-Creatinine Ratio, Urine Random    5. Benign essential hypertensio  - TSH with reflex to Free T4 if abnormal; Future  - CBC and Auto Differential; Future  - Comprehensive Metabolic Panel; Future  - Hemoglobin A1C; Future  - Lipid Panel; Future  - Vitamin B12; Future  - Folate; Future  - Iron and TIBC; Future  - Ferritin; Future  - Albumin-Creatinine Ratio, Urine Random; Future  - TSH with reflex to Free T4 if abnormal  - CBC and Auto Differential  - Comprehensive Metabolic Panel  - Hemoglobin A1C  - Lipid Panel  - Vitamin B12  - Folate  - Iron and TIBC  - Ferritin  - Albumin-Creatinine Ratio, Urine Random    6. Frequent PVCs  - TSH with reflex to Free T4 if abnormal; Future  - CBC and Auto Differential; Future  - Comprehensive Metabolic Panel; Future  - Hemoglobin A1C; Future  - Lipid Panel; Future  - Vitamin B12; Future  - Folate; Future  - Iron and TIBC; Future  - Ferritin; Future  - Albumin-Creatinine Ratio, Urine Random; Future  - TSH with reflex to Free T4 if abnormal  - CBC and Auto Differential  - Comprehensive Metabolic  Panel  - Hemoglobin A1C  - Lipid Panel  - Vitamin B12  - Folate  - Iron and TIBC  - Ferritin  - Albumin-Creatinine Ratio, Urine Random    7. Other fatigue  - TSH with reflex to Free T4 if abnormal; Future  - CBC and Auto Differential; Future  - Comprehensive Metabolic Panel; Future  - Hemoglobin A1C; Future  - Lipid Panel; Future  - Vitamin B12; Future  - Folate; Future  - Iron and TIBC; Future  - Ferritin; Future  - Albumin-Creatinine Ratio, Urine Random; Future  - TSH with reflex to Free T4 if abnormal  - CBC and Auto Differential  - Comprehensive Metabolic Panel  - Hemoglobin A1C  - Lipid Panel  - Vitamin B12  - Folate  - Iron and TIBC  - Ferritin  - Albumin-Creatinine Ratio, Urine Random    8. Dyspnea on exertion  - TSH with reflex to Free T4 if abnormal; Future  - CBC and Auto Differential; Future  - Comprehensive Metabolic Panel; Future  - Hemoglobin A1C; Future  - Lipid Panel; Future  - Vitamin B12; Future  - Folate; Future  - Iron and TIBC; Future  - Ferritin; Future  - Albumin-Creatinine Ratio, Urine Random; Future  - TSH with reflex to Free T4 if abnormal  - CBC and Auto Differential  - Comprehensive Metabolic Panel  - Hemoglobin A1C  - Lipid Panel  - Vitamin B12  - Folate  - Iron and TIBC  - Ferritin  - Albumin-Creatinine Ratio, Urine Random    9. Type 2 diabetes mellitus with hyperglycemia, without long-term current use of insulin  - TSH with reflex to Free T4 if abnormal; Future  - CBC and Auto Differential; Future  - Comprehensive Metabolic Panel; Future  - Hemoglobin A1C; Future  - Lipid Panel; Future  - Vitamin B12; Future  - Folate; Future  - Iron and TIBC; Future  - Ferritin; Future  - Albumin-Creatinine Ratio, Urine Random; Future  - TSH with reflex to Free T4 if abnormal  - CBC and Auto Differential  - Comprehensive Metabolic Panel  - Hemoglobin A1C  - Lipid Panel  - Vitamin B12  - Folate  - Iron and TIBC  - Ferritin  - Albumin-Creatinine Ratio, Urine Random    10. Vitamin B12 deficiency  - TSH  with reflex to Free T4 if abnormal; Future  - CBC and Auto Differential; Future  - Comprehensive Metabolic Panel; Future  - Hemoglobin A1C; Future  - Lipid Panel; Future  - Vitamin B12; Future  - Folate; Future  - Iron and TIBC; Future  - Ferritin; Future  - Albumin-Creatinine Ratio, Urine Random; Future  - TSH with reflex to Free T4 if abnormal  - CBC and Auto Differential  - Comprehensive Metabolic Panel  - Hemoglobin A1C  - Lipid Panel  - Vitamin B12  - Folate  - Iron and TIBC  - Ferritin  - Albumin-Creatinine Ratio, Urine Random    11. Vitamin D deficiency  - TSH with reflex to Free T4 if abnormal; Future  - CBC and Auto Differential; Future  - Comprehensive Metabolic Panel; Future  - Hemoglobin A1C; Future  - Lipid Panel; Future  - Vitamin B12; Future  - Folate; Future  - Iron and TIBC; Future  - Ferritin; Future  - Albumin-Creatinine Ratio, Urine Random; Future  - TSH with reflex to Free T4 if abnormal  - CBC and Auto Differential  - Comprehensive Metabolic Panel  - Hemoglobin A1C  - Lipid Panel  - Vitamin B12  - Folate  - Iron and TIBC  - Ferritin  - Albumin-Creatinine Ratio, Urine Random    12. Paroxysmal atrial fibrillation (Multi)  - TSH with reflex to Free T4 if abnormal; Future  - CBC and Auto Differential; Future  - Comprehensive Metabolic Panel; Future  - Hemoglobin A1C; Future  - Lipid Panel; Future  - Vitamin B12; Future  - Folate; Future  - Iron and TIBC; Future  - Ferritin; Future  - Albumin-Creatinine Ratio, Urine Random; Future  - TSH with reflex to Free T4 if abnormal  - CBC and Auto Differential  - Comprehensive Metabolic Panel  - Hemoglobin A1C  - Lipid Panel  - Vitamin B12  - Folate  - Iron and TIBC  - Ferritin  - Albumin-Creatinine Ratio, Urine Random    13. Kidney lesion, native, bilateral  - TSH with reflex to Free T4 if abnormal; Future  - CBC and Auto Differential; Future  - Comprehensive Metabolic Panel; Future  - Hemoglobin A1C; Future  - Lipid Panel; Future  - Vitamin B12; Future  -  Folate; Future  - Iron and TIBC; Future  - Ferritin; Future  - Albumin-Creatinine Ratio, Urine Random; Future  - TSH with reflex to Free T4 if abnormal  - CBC and Auto Differential  - Comprehensive Metabolic Panel  - Hemoglobin A1C  - Lipid Panel  - Vitamin B12  - Folate  - Iron and TIBC  - Ferritin  - Albumin-Creatinine Ratio, Urine Random    14. Mixed hyperlipidemia  - TSH with reflex to Free T4 if abnormal; Future  - CBC and Auto Differential; Future  - Comprehensive Metabolic Panel; Future  - Hemoglobin A1C; Future  - Lipid Panel; Future  - Vitamin B12; Future  - Folate; Future  - Iron and TIBC; Future  - Ferritin; Future  - Albumin-Creatinine Ratio, Urine Random; Future  - TSH with reflex to Free T4 if abnormal  - CBC and Auto Differential  - Comprehensive Metabolic Panel  - Hemoglobin A1C  - Lipid Panel  - Vitamin B12  - Folate  - Iron and TIBC  - Ferritin  - Albumin-Creatinine Ratio, Urine Random

## 2025-08-04 NOTE — TELEPHONE ENCOUNTER
Called patient was upset at first but got to calm him down. Patient was disappointed because we no longer have lab on campus. I advised patient to go to [email or web address] and gave him further instruction. Patient had no further questions at this time.

## 2025-08-07 LAB
ALBUMIN SERPL-MCNC: 4.1 G/DL (ref 3.6–5.1)
ALBUMIN/CREAT UR: 151 MG/G CREAT
ALP SERPL-CCNC: 55 U/L (ref 35–144)
ALT SERPL-CCNC: 11 U/L (ref 9–46)
ANION GAP SERPL CALCULATED.4IONS-SCNC: 10 MMOL/L (CALC) (ref 7–17)
AST SERPL-CCNC: 18 U/L (ref 10–35)
BASOPHILS # BLD AUTO: 60 CELLS/UL (ref 0–200)
BASOPHILS NFR BLD AUTO: 0.9 %
BILIRUB SERPL-MCNC: 0.6 MG/DL (ref 0.2–1.2)
BUN SERPL-MCNC: 20 MG/DL (ref 7–25)
CALCIUM SERPL-MCNC: 9.2 MG/DL (ref 8.6–10.3)
CHLORIDE SERPL-SCNC: 100 MMOL/L (ref 98–110)
CHOLEST SERPL-MCNC: 148 MG/DL
CHOLEST/HDLC SERPL: 2.9 (CALC)
CO2 SERPL-SCNC: 26 MMOL/L (ref 20–32)
CREAT SERPL-MCNC: 1.16 MG/DL (ref 0.7–1.22)
CREAT UR-MCNC: 138 MG/DL (ref 20–320)
EGFRCR SERPLBLD CKD-EPI 2021: 60 ML/MIN/1.73M2
EOSINOPHIL # BLD AUTO: 127 CELLS/UL (ref 15–500)
EOSINOPHIL NFR BLD AUTO: 1.9 %
ERYTHROCYTE [DISTWIDTH] IN BLOOD BY AUTOMATED COUNT: 13.4 % (ref 11–15)
EST. AVERAGE GLUCOSE BLD GHB EST-MCNC: 128 MG/DL
EST. AVERAGE GLUCOSE BLD GHB EST-SCNC: 7.1 MMOL/L
FERRITIN SERPL-MCNC: 86 NG/ML (ref 24–380)
FOLATE SERPL-MCNC: 17.3 NG/ML
GLUCOSE SERPL-MCNC: 110 MG/DL (ref 65–99)
HBA1C MFR BLD: 6.1 %
HCT VFR BLD AUTO: 35.4 % (ref 38.5–50)
HDLC SERPL-MCNC: 51 MG/DL
HGB BLD-MCNC: 11.6 G/DL (ref 13.2–17.1)
IRON SATN MFR SERPL: 28 % (CALC) (ref 20–48)
IRON SERPL-MCNC: 67 MCG/DL (ref 50–180)
LDLC SERPL CALC-MCNC: 78 MG/DL (CALC)
LYMPHOCYTES # BLD AUTO: 1132 CELLS/UL (ref 850–3900)
LYMPHOCYTES NFR BLD AUTO: 16.9 %
MCH RBC QN AUTO: 30.5 PG (ref 27–33)
MCHC RBC AUTO-ENTMCNC: 32.8 G/DL (ref 32–36)
MCV RBC AUTO: 93.2 FL (ref 80–100)
MICROALBUMIN UR-MCNC: 20.9 MG/DL
MONOCYTES # BLD AUTO: 804 CELLS/UL (ref 200–950)
MONOCYTES NFR BLD AUTO: 12 %
NEUTROPHILS # BLD AUTO: 4576 CELLS/UL (ref 1500–7800)
NEUTROPHILS NFR BLD AUTO: 68.3 %
NONHDLC SERPL-MCNC: 97 MG/DL (CALC)
PLATELET # BLD AUTO: 225 THOUSAND/UL (ref 140–400)
PMV BLD REES-ECKER: 11.2 FL (ref 7.5–12.5)
POTASSIUM SERPL-SCNC: 4.7 MMOL/L (ref 3.5–5.3)
PROT SERPL-MCNC: 6.7 G/DL (ref 6.1–8.1)
RBC # BLD AUTO: 3.8 MILLION/UL (ref 4.2–5.8)
SODIUM SERPL-SCNC: 136 MMOL/L (ref 135–146)
TIBC SERPL-MCNC: 240 MCG/DL (CALC) (ref 250–425)
TRIGL SERPL-MCNC: 107 MG/DL
TSH SERPL-ACNC: 3.76 MIU/L (ref 0.4–4.5)
VIT B12 SERPL-MCNC: 329 PG/ML (ref 200–1100)
WBC # BLD AUTO: 6.7 THOUSAND/UL (ref 3.8–10.8)

## 2025-08-15 ENCOUNTER — APPOINTMENT (OUTPATIENT)
Dept: PRIMARY CARE | Facility: CLINIC | Age: 89
End: 2025-08-15
Payer: MEDICARE

## 2025-08-18 ENCOUNTER — PATIENT OUTREACH (OUTPATIENT)
Dept: PRIMARY CARE | Facility: CLINIC | Age: 89
End: 2025-08-18
Payer: MEDICARE

## 2025-08-19 ENCOUNTER — TELEPHONE (OUTPATIENT)
Dept: PRIMARY CARE | Facility: CLINIC | Age: 89
End: 2025-08-19
Payer: MEDICARE

## 2025-08-19 ENCOUNTER — PATIENT OUTREACH (OUTPATIENT)
Dept: PRIMARY CARE | Facility: CLINIC | Age: 89
End: 2025-08-19
Payer: MEDICARE

## 2025-08-19 DIAGNOSIS — R29.6 RECURRENT FALLS: ICD-10-CM

## 2025-08-19 DIAGNOSIS — I10 BENIGN ESSENTIAL HYPERTENSION: ICD-10-CM

## 2025-08-19 DIAGNOSIS — E11.40 TYPE 2 DIABETES MELLITUS WITH DIABETIC NEUROPATHY, WITHOUT LONG-TERM CURRENT USE OF INSULIN: ICD-10-CM

## 2025-08-19 DIAGNOSIS — N40.1 BPH WITH OBSTRUCTION/LOWER URINARY TRACT SYMPTOMS: ICD-10-CM

## 2025-08-19 DIAGNOSIS — N13.8 BPH WITH OBSTRUCTION/LOWER URINARY TRACT SYMPTOMS: ICD-10-CM

## 2025-08-19 RX ORDER — METFORMIN HYDROCHLORIDE 500 MG/1
500 TABLET, EXTENDED RELEASE ORAL
Qty: 270 TABLET | Refills: 3 | Status: SHIPPED | OUTPATIENT
Start: 2025-08-19

## 2025-08-23 ENCOUNTER — DOCUMENTATION (OUTPATIENT)
Dept: HOME HEALTH SERVICES | Facility: HOME HEALTH | Age: 89
End: 2025-08-23
Payer: MEDICARE

## 2025-08-25 ENCOUNTER — HOME CARE VISIT (OUTPATIENT)
Dept: HOME HEALTH SERVICES | Facility: HOME HEALTH | Age: 89
End: 2025-08-25
Payer: MEDICARE

## 2025-08-25 VITALS
RESPIRATION RATE: 18 BRPM | OXYGEN SATURATION: 98 % | TEMPERATURE: 98.1 F | HEART RATE: 61 BPM | DIASTOLIC BLOOD PRESSURE: 80 MMHG | SYSTOLIC BLOOD PRESSURE: 144 MMHG

## 2025-08-25 PROCEDURE — G0151 HHCP-SERV OF PT,EA 15 MIN: HCPCS

## 2025-08-25 PROCEDURE — G0299 HHS/HOSPICE OF RN EA 15 MIN: HCPCS

## 2025-08-25 SDOH — HEALTH STABILITY: PHYSICAL HEALTH: EXERCISE COMMENTS: HEEL TOE RAISE, MARCHING, LAQ, HIP ADDUCTION    QUAD SET, GLUT SET

## 2025-08-25 SDOH — HEALTH STABILITY: PHYSICAL HEALTH: EXERCISE TYPE: SEATED

## 2025-08-25 ASSESSMENT — ACTIVITIES OF DAILY LIVING (ADL)
ENTERING_EXITING_HOME: STAND BY ASSIST
OASIS_M1830: 03
AMBULATION ASSISTANCE ON FLAT SURFACES: 1
AMBULATION_DISTANCE/DURATION_TOLERATED: 50 FEET

## 2025-08-25 ASSESSMENT — ENCOUNTER SYMPTOMS
HIGHEST PAIN SEVERITY IN PAST 24 HOURS: 8/10
PAIN LOCATION - PAIN SEVERITY: 8/10
PAIN LOCATION: RIGHT KNEE
PAIN LOCATION: RIGHT KNEE
APPETITE LEVEL: FAIR
SLEEP QUALITY: ADEQUATE
AGGRESSION WITHIN DEFINED LIMITS: 1
PAIN LOCATION: LEFT KNEE
SUBJECTIVE PAIN PROGRESSION: WAXING AND WANING
PAIN LOCATION - PAIN QUALITY: SHARP
LOWER EXTREMITY EDEMA: 1
LOWEST PAIN SEVERITY IN PAST 24 HOURS: 8/10
PERSON REPORTING PAIN: PATIENT
PAIN SEVERITY GOAL: 0/10
PAIN: 1
FREQUENCY: 1
PAIN LOCATION: LEFT KNEE
LOWEST PAIN SEVERITY IN PAST 24 HOURS: 0/10
PAIN LOCATION - PAIN QUALITY: SHARP
MUSCLE WEAKNESS: 1
PAIN LOCATION - PAIN SEVERITY: 8/10
ANGER WITHIN DEFINED LIMITS: 1
HIGHEST PAIN SEVERITY IN PAST 24 HOURS: 8/10

## 2025-08-25 ASSESSMENT — PAIN SCALES - PAIN ASSESSMENT IN ADVANCED DEMENTIA (PAINAD): BREATHING: 0

## 2025-08-27 ENCOUNTER — HOME CARE VISIT (OUTPATIENT)
Dept: HOME HEALTH SERVICES | Facility: HOME HEALTH | Age: 89
End: 2025-08-27
Payer: MEDICARE

## 2025-08-27 PROCEDURE — G0151 HHCP-SERV OF PT,EA 15 MIN: HCPCS

## 2025-08-27 SDOH — HEALTH STABILITY: PHYSICAL HEALTH: EXERCISE TYPE: SEATED/SUPINE

## 2025-08-27 SDOH — HEALTH STABILITY: PHYSICAL HEALTH
EXERCISE COMMENTS: HEEL TOE RAISE, MARCHING, LAQ, HIP ADDUCTION, HIP ABDUCITON RED TBAN    ANKLE PUMPS; GLUT SET; QUAD SET; SAQ;

## 2025-08-27 ASSESSMENT — ENCOUNTER SYMPTOMS
PAIN LOCATION - PAIN SEVERITY: 9/10
PAIN LOCATION: LEFT KNEE
LOWEST PAIN SEVERITY IN PAST 24 HOURS: 6/10
PAIN: 1
PAIN LOCATION - PAIN QUALITY: ACHING
PAIN LOCATION: RIGHT KNEE
PAIN LOCATION - PAIN SEVERITY: 9/10
PAIN LOCATION - PAIN QUALITY: ACHING
SUBJECTIVE PAIN PROGRESSION: WAXING AND WANING
PERSON REPORTING PAIN: PATIENT
HIGHEST PAIN SEVERITY IN PAST 24 HOURS: 10/10

## 2025-08-27 ASSESSMENT — ACTIVITIES OF DAILY LIVING (ADL)
AMBULATION_DISTANCE/DURATION_TOLERATED: 50 FEET
AMBULATION ASSISTANCE ON FLAT SURFACES: 1

## 2025-08-28 ENCOUNTER — HOME CARE VISIT (OUTPATIENT)
Dept: HOME HEALTH SERVICES | Facility: HOME HEALTH | Age: 89
End: 2025-08-28
Payer: MEDICARE

## 2025-08-28 VITALS — OXYGEN SATURATION: 98 % | DIASTOLIC BLOOD PRESSURE: 60 MMHG | SYSTOLIC BLOOD PRESSURE: 130 MMHG | HEART RATE: 67 BPM

## 2025-08-28 PROCEDURE — G0152 HHCP-SERV OF OT,EA 15 MIN: HCPCS

## 2025-08-28 ASSESSMENT — ENCOUNTER SYMPTOMS
SUBJECTIVE PAIN PROGRESSION: UNCHANGED
PAIN: 1
PAIN LOCATION: LEFT KNEE
PERSON REPORTING PAIN: PATIENT
PAIN LOCATION - PAIN FREQUENCY: INFREQUENT
PAIN LOCATION - EXACERBATING FACTORS: ACTIVITY
LOWEST PAIN SEVERITY IN PAST 24 HOURS: 5/10
PAIN LOCATION: RIGHT KNEE
PAIN LOCATION - PAIN SEVERITY: 9/10
PAIN LOCATION - EXACERBATING FACTORS: ACTIVITY
PAIN LOCATION - PAIN SEVERITY: 9/10
PAIN LOCATION - PAIN FREQUENCY: FREQUENT
HIGHEST PAIN SEVERITY IN PAST 24 HOURS: 9/10

## 2025-08-28 ASSESSMENT — ACTIVITIES OF DAILY LIVING (ADL)
BATHING_CURRENT_FUNCTION: SUPERVISION
BATHING ASSESSED: 1
DRESSING_LB_CURRENT_FUNCTION: SUPERVISION
TOILETING: SUPERVISION
TOILETING: 1
DRESSING_UB_CURRENT_FUNCTION: INDEPENDENT

## 2025-08-29 ENCOUNTER — TELEPHONE (OUTPATIENT)
Dept: PRIMARY CARE | Facility: CLINIC | Age: 89
End: 2025-08-29
Payer: MEDICARE

## 2025-09-02 ENCOUNTER — HOME CARE VISIT (OUTPATIENT)
Dept: HOME HEALTH SERVICES | Facility: HOME HEALTH | Age: 89
End: 2025-09-02
Payer: MEDICARE

## 2025-09-02 VITALS
OXYGEN SATURATION: 99 % | HEART RATE: 74 BPM | SYSTOLIC BLOOD PRESSURE: 148 MMHG | RESPIRATION RATE: 18 BRPM | TEMPERATURE: 97.8 F | DIASTOLIC BLOOD PRESSURE: 80 MMHG

## 2025-09-02 PROCEDURE — G0299 HHS/HOSPICE OF RN EA 15 MIN: HCPCS

## 2025-09-02 ASSESSMENT — ENCOUNTER SYMPTOMS
PAIN SEVERITY GOAL: 0/10
MUSCLE WEAKNESS: 1
PAIN LOCATION: RIGHT KNEE
LOWEST PAIN SEVERITY IN PAST 24 HOURS: 3/10
LOWER EXTREMITY EDEMA: 1
APPETITE LEVEL: FAIR
HIGHEST PAIN SEVERITY IN PAST 24 HOURS: 8/10
PAIN LOCATION: LEFT KNEE

## 2025-09-02 ASSESSMENT — PAIN SCALES - PAIN ASSESSMENT IN ADVANCED DEMENTIA (PAINAD): BREATHING: 0

## 2025-09-03 ENCOUNTER — HOME CARE VISIT (OUTPATIENT)
Dept: HOME HEALTH SERVICES | Facility: HOME HEALTH | Age: 89
End: 2025-09-03
Payer: MEDICARE

## 2025-09-03 PROBLEM — R29.6 RECURRENT FALLS: Status: ACTIVE | Noted: 2025-09-03

## 2025-09-03 PROBLEM — N18.2 STAGE 2 CHRONIC KIDNEY DISEASE: Status: ACTIVE | Noted: 2025-09-03

## 2025-09-03 PROBLEM — R80.8 OTHER PROTEINURIA: Status: ACTIVE | Noted: 2025-09-03

## 2025-09-03 PROCEDURE — G0151 HHCP-SERV OF PT,EA 15 MIN: HCPCS

## 2025-09-03 SDOH — HEALTH STABILITY: PHYSICAL HEALTH
EXERCISE COMMENTS: HEEL TOE RAISE, MARCHING, LAQ, HIP ADDUCTION  HIP ABDUCTION, HAMSTRING CURL, MARCHING- RED T BAND  QUAD SETS, SLR. SAQ

## 2025-09-03 SDOH — HEALTH STABILITY: PHYSICAL HEALTH: EXERCISE TYPE: SETAED/SUPINE

## 2025-09-03 ASSESSMENT — ENCOUNTER SYMPTOMS
PAIN LOCATION: LEFT KNEE
PAIN: 1
SUBJECTIVE PAIN PROGRESSION: WAXING AND WANING
LOWEST PAIN SEVERITY IN PAST 24 HOURS: 8/10
PAIN LOCATION: RIGHT KNEE
HIGHEST PAIN SEVERITY IN PAST 24 HOURS: 10/10
PAIN LOCATION - PAIN QUALITY: SHARP
PAIN LOCATION - PAIN SEVERITY: 9/10
PERSON REPORTING PAIN: PATIENT
PAIN LOCATION - PAIN SEVERITY: 9/10
PAIN LOCATION - PAIN QUALITY: SHARP

## 2025-09-03 ASSESSMENT — ACTIVITIES OF DAILY LIVING (ADL): AMBULATION_DISTANCE/DURATION_TOLERATED: 50 FEET

## 2025-09-04 ENCOUNTER — HOME CARE VISIT (OUTPATIENT)
Dept: HOME HEALTH SERVICES | Facility: HOME HEALTH | Age: 89
End: 2025-09-04
Payer: MEDICARE

## 2025-09-04 PROCEDURE — G0151 HHCP-SERV OF PT,EA 15 MIN: HCPCS

## 2025-09-04 SDOH — HEALTH STABILITY: PHYSICAL HEALTH: EXERCISE TYPE: SEATED

## 2025-09-04 SDOH — HEALTH STABILITY: PHYSICAL HEALTH: EXERCISE COMMENTS: SEATE GENTLE STRETCHAING OF GASTROC ADN HAMSTRING

## 2025-09-04 ASSESSMENT — ENCOUNTER SYMPTOMS
PAIN LOCATION - PAIN QUALITY: ACHING
HIGHEST PAIN SEVERITY IN PAST 24 HOURS: 10/10
PAIN LOCATION: RIGHT KNEE
PAIN: 1
SUBJECTIVE PAIN PROGRESSION: WAXING AND WANING
PERSON REPORTING PAIN: PATIENT
PAIN LOCATION - PAIN SEVERITY: 10/10

## 2026-02-19 ENCOUNTER — APPOINTMENT (OUTPATIENT)
Dept: PRIMARY CARE | Facility: CLINIC | Age: OVER 89
End: 2026-02-19
Payer: MEDICARE